# Patient Record
Sex: FEMALE | Race: WHITE | NOT HISPANIC OR LATINO
[De-identification: names, ages, dates, MRNs, and addresses within clinical notes are randomized per-mention and may not be internally consistent; named-entity substitution may affect disease eponyms.]

---

## 2018-07-10 ENCOUNTER — RESULT REVIEW (OUTPATIENT)
Age: 48
End: 2018-07-10

## 2018-11-14 ENCOUNTER — RECORD ABSTRACTING (OUTPATIENT)
Age: 48
End: 2018-11-14

## 2018-11-14 DIAGNOSIS — Z82.0 FAMILY HISTORY OF EPILEPSY AND OTHER DISEASES OF THE NERVOUS SYSTEM: ICD-10-CM

## 2018-11-14 DIAGNOSIS — Z82.62 FAMILY HISTORY OF OSTEOPOROSIS: ICD-10-CM

## 2018-11-14 DIAGNOSIS — A41.02 SEPSIS DUE TO METHICILLIN RESISTANT STAPHYLOCOCCUS AUREUS: ICD-10-CM

## 2018-11-14 DIAGNOSIS — Z86.2 PERSONAL HISTORY OF DISEASES OF THE BLOOD AND BLOOD-FORMING ORGANS AND CERTAIN DISORDERS INVOLVING THE IMMUNE MECHANISM: ICD-10-CM

## 2018-11-14 DIAGNOSIS — Z82.49 FAMILY HISTORY OF ISCHEMIC HEART DISEASE AND OTHER DISEASES OF THE CIRCULATORY SYSTEM: ICD-10-CM

## 2018-11-14 DIAGNOSIS — Z86.79 PERSONAL HISTORY OF OTHER DISEASES OF THE CIRCULATORY SYSTEM: ICD-10-CM

## 2018-11-14 DIAGNOSIS — Z83.438 FAMILY HISTORY OF OTHER DISORDER OF LIPOPROTEIN METABOLISM AND OTHER LIPIDEMIA: ICD-10-CM

## 2018-12-12 ENCOUNTER — APPOINTMENT (OUTPATIENT)
Dept: INTERNAL MEDICINE | Facility: CLINIC | Age: 48
End: 2018-12-12
Payer: COMMERCIAL

## 2018-12-12 VITALS
WEIGHT: 172 LBS | HEART RATE: 68 BPM | DIASTOLIC BLOOD PRESSURE: 70 MMHG | HEIGHT: 65 IN | SYSTOLIC BLOOD PRESSURE: 120 MMHG | BODY MASS INDEX: 28.66 KG/M2

## 2018-12-12 PROCEDURE — 99213 OFFICE O/P EST LOW 20 MIN: CPT | Mod: 25

## 2018-12-12 PROCEDURE — 36415 COLL VENOUS BLD VENIPUNCTURE: CPT

## 2018-12-12 NOTE — REVIEW OF SYSTEMS
[Vision Problems] : no vision problems [Chest Pain] : no chest pain [Palpitations] : no palpitations [Orthopnea] : no orthopnea [Shortness Of Breath] : no shortness of breath [Cough] : no cough [Nocturia] : no nocturia [Frequency] : no frequency [Headache] : no headache [Dizziness] : no dizziness

## 2018-12-12 NOTE — PHYSICAL EXAM
[No Acute Distress] : no acute distress [Well Nourished] : well nourished [No JVD] : no jugular venous distention [No Respiratory Distress] : no respiratory distress  [Clear to Auscultation] : lungs were clear to auscultation bilaterally [Normal Rate] : normal rate  [Regular Rhythm] : with a regular rhythm [No Edema] : there was no peripheral edema [de-identified] :  + split S1, normal S2; no click or rubs, no JVD, no S4, +1/6 JUSTIN at base radiating to right carotid.

## 2018-12-12 NOTE — HISTORY OF PRESENT ILLNESS
[FreeTextEntry1] : here for followup of hypertension and impaired fasting glucose\par \par had mammos 10/3/2018 neg [de-identified] : Hypertension. \par      Compared to last visit the hypertension is at goal . The patients cardiovascular risk factors include hypertension . Lifestyle modifications that the patient has adopted include dietary sodium reduction , increasing physical activity , attempts at weight reduction . Responses to the medications has been excellent . Adverse effects of the medication include none . Patient denies chest pain , palpitations, headaches, dizziness.

## 2018-12-13 LAB
ALBUMIN SERPL ELPH-MCNC: 4.7 G/DL
ALP BLD-CCNC: 86 U/L
ALT SERPL-CCNC: 17 U/L
ANION GAP SERPL CALC-SCNC: 13 MMOL/L
AST SERPL-CCNC: 13 U/L
BILIRUB SERPL-MCNC: 0.2 MG/DL
BUN SERPL-MCNC: 17 MG/DL
CALCIUM SERPL-MCNC: 9.4 MG/DL
CHLORIDE SERPL-SCNC: 105 MMOL/L
CHOLEST SERPL-MCNC: 192 MG/DL
CHOLEST/HDLC SERPL: 4.4 RATIO
CK SERPL-CCNC: 75 U/L
CO2 SERPL-SCNC: 25 MMOL/L
CREAT SERPL-MCNC: 0.77 MG/DL
GLUCOSE SERPL-MCNC: 98 MG/DL
HBA1C MFR BLD HPLC: 6.1 %
HDLC SERPL-MCNC: 44 MG/DL
LDLC SERPL CALC-MCNC: 124 MG/DL
POTASSIUM SERPL-SCNC: 4.3 MMOL/L
PROT SERPL-MCNC: 7.2 G/DL
SODIUM SERPL-SCNC: 143 MMOL/L
TRIGL SERPL-MCNC: 122 MG/DL

## 2019-03-06 ENCOUNTER — APPOINTMENT (OUTPATIENT)
Dept: INTERNAL MEDICINE | Facility: CLINIC | Age: 49
End: 2019-03-06
Payer: COMMERCIAL

## 2019-03-06 VITALS
DIASTOLIC BLOOD PRESSURE: 70 MMHG | BODY MASS INDEX: 28.32 KG/M2 | WEIGHT: 170 LBS | HEIGHT: 65 IN | SYSTOLIC BLOOD PRESSURE: 120 MMHG | HEART RATE: 72 BPM

## 2019-03-06 PROCEDURE — 36415 COLL VENOUS BLD VENIPUNCTURE: CPT

## 2019-03-06 PROCEDURE — 99213 OFFICE O/P EST LOW 20 MIN: CPT | Mod: 25

## 2019-03-06 NOTE — HISTORY OF PRESENT ILLNESS
[FreeTextEntry1] : here for followup of hypertension and impaired fasting glucose [de-identified] : Hypertension. \par      Compared to last visit the hypertension is at goal . The patients cardiovascular risk factors include hypertension . Lifestyle modifications that the patient has adopted include dietary sodium reduction , increasing physical activity , attempts at weight reduction . Responses to the medications has been excellent . Adverse effects of the medication include none . Patient denies chest pain , palpitations, headaches, dizziness. \par \par Last A1c 6.1 Dec

## 2019-03-06 NOTE — ASSESSMENT
[FreeTextEntry1] :  Essential (primary) hypertension - I10 (Primary), Cont med and low salt diet. Urged to increase physical activity and increase efforts at wt loss. BP control remains excellent. \par \par Leukocytosis, unspecified elevated WBC count - D72.829, D/w patient she may be an outlier and slightly high wbc may be normal for her. Check cbc prn. Last WBC stable at 10.9\par  \par Impaired fasting glucose - R73.01, Check A1c if glucose consistently high. D/w pt rationale for it and criteria for dm. Last A1c NOT at goal 6.1. I strongly urged she lose some weight.\par  \par Gen. anxiety disorder - Continue sertraline\par

## 2019-03-06 NOTE — PHYSICAL EXAM
[No Acute Distress] : no acute distress [Well Nourished] : well nourished [No JVD] : no jugular venous distention [No Respiratory Distress] : no respiratory distress  [Clear to Auscultation] : lungs were clear to auscultation bilaterally [Normal Rate] : normal rate  [Regular Rhythm] : with a regular rhythm [No Edema] : there was no peripheral edema [de-identified] :  + split S1, normal S2; no click or rubs, no JVD, no S4, +1/6 JUSTIN at base radiating to right carotid.

## 2019-03-07 LAB
BASOPHILS # BLD AUTO: 0.09 K/UL
BASOPHILS NFR BLD AUTO: 0.9 %
EOSINOPHIL # BLD AUTO: 0.29 K/UL
EOSINOPHIL NFR BLD AUTO: 3 %
HCT VFR BLD CALC: 41.9 %
HGB BLD-MCNC: 12.6 G/DL
IMM GRANULOCYTES NFR BLD AUTO: 0.2 %
LYMPHOCYTES # BLD AUTO: 1.55 K/UL
LYMPHOCYTES NFR BLD AUTO: 15.8 %
MAN DIFF?: NORMAL
MCHC RBC-ENTMCNC: 27.9 PG
MCHC RBC-ENTMCNC: 30.1 GM/DL
MCV RBC AUTO: 92.7 FL
MONOCYTES # BLD AUTO: 0.67 K/UL
MONOCYTES NFR BLD AUTO: 6.8 %
NEUTROPHILS # BLD AUTO: 7.17 K/UL
NEUTROPHILS NFR BLD AUTO: 73.3 %
PLATELET # BLD AUTO: 411 K/UL
RBC # BLD: 4.52 M/UL
RBC # FLD: 13.7 %
WBC # FLD AUTO: 9.79 K/UL

## 2019-05-01 ENCOUNTER — RX RENEWAL (OUTPATIENT)
Age: 49
End: 2019-05-01

## 2019-06-05 ENCOUNTER — APPOINTMENT (OUTPATIENT)
Dept: INTERNAL MEDICINE | Facility: CLINIC | Age: 49
End: 2019-06-05

## 2019-08-15 ENCOUNTER — RX RENEWAL (OUTPATIENT)
Age: 49
End: 2019-08-15

## 2019-08-15 NOTE — ASSESSMENT
[FreeTextEntry1] :  Essential (primary) hypertension - I10 (Primary), Cont med and low salt diet. Urged to increase physical activity and increase efforts at wt loss. BP control remains excellent. \par \par Leukocytosis, unspecified elevated WBC count - D72.829, D/w patient she may be an outlier and slightly high wbc may be normal for her. Check cbc prn. Last WBC stable at 10.9\par  \par Impaired fasting glucose - R73.01, Check A1c if glucose consistently high. D/w pt rationale for it and criteria for dm. Last A1c stable at 5.9\par  \par BONNIE - Continue sertraline\par  Stable

## 2019-09-18 ENCOUNTER — LABORATORY RESULT (OUTPATIENT)
Age: 49
End: 2019-09-18

## 2019-09-18 ENCOUNTER — APPOINTMENT (OUTPATIENT)
Dept: INTERNAL MEDICINE | Facility: CLINIC | Age: 49
End: 2019-09-18
Payer: COMMERCIAL

## 2019-09-18 VITALS
SYSTOLIC BLOOD PRESSURE: 120 MMHG | DIASTOLIC BLOOD PRESSURE: 70 MMHG | WEIGHT: 174 LBS | BODY MASS INDEX: 28.96 KG/M2 | HEART RATE: 78 BPM

## 2019-09-18 DIAGNOSIS — Z78.9 OTHER SPECIFIED HEALTH STATUS: ICD-10-CM

## 2019-09-18 LAB — CYTOLOGY CVX/VAG DOC THIN PREP: NORMAL

## 2019-09-18 PROCEDURE — 99396 PREV VISIT EST AGE 40-64: CPT | Mod: 25

## 2019-09-18 PROCEDURE — G0444 DEPRESSION SCREEN ANNUAL: CPT

## 2019-09-18 PROCEDURE — 36415 COLL VENOUS BLD VENIPUNCTURE: CPT

## 2019-09-18 PROCEDURE — 93000 ELECTROCARDIOGRAM COMPLETE: CPT

## 2019-09-18 NOTE — PHYSICAL EXAM
[No Acute Distress] : no acute distress [Well Nourished] : well nourished [Well Developed] : well developed [Well-Appearing] : well-appearing [Normal Sclera/Conjunctiva] : normal sclera/conjunctiva [PERRL] : pupils equal round and reactive to light [Normal Outer Ear/Nose] : the outer ears and nose were normal in appearance [EOMI] : extraocular movements intact [No JVD] : no jugular venous distention [Normal Oropharynx] : the oropharynx was normal [No Lymphadenopathy] : no lymphadenopathy [Supple] : supple [Thyroid Normal, No Nodules] : the thyroid was normal and there were no nodules present [No Respiratory Distress] : no respiratory distress  [No Accessory Muscle Use] : no accessory muscle use [Clear to Auscultation] : lungs were clear to auscultation bilaterally [Normal Rate] : normal rate  [Regular Rhythm] : with a regular rhythm [Normal S1, S2] : normal S1 and S2 [No Murmur] : no murmur heard [No Carotid Bruits] : no carotid bruits [No Abdominal Bruit] : a ~M bruit was not heard ~T in the abdomen [No Varicosities] : no varicosities [Pedal Pulses Present] : the pedal pulses are present [No Edema] : there was no peripheral edema [No Palpable Aorta] : no palpable aorta [Soft] : abdomen soft [No Extremity Clubbing/Cyanosis] : no extremity clubbing/cyanosis [Non Tender] : non-tender [Non-distended] : non-distended [No HSM] : no HSM [Normal Bowel Sounds] : normal bowel sounds [Normal Anterior Cervical Nodes] : no anterior cervical lymphadenopathy [Normal Posterior Cervical Nodes] : no posterior cervical lymphadenopathy [No CVA Tenderness] : no CVA  tenderness [No Spinal Tenderness] : no spinal tenderness [No Joint Swelling] : no joint swelling [Grossly Normal Strength/Tone] : grossly normal strength/tone [No Rash] : no rash [Coordination Grossly Intact] : coordination grossly intact [No Focal Deficits] : no focal deficits [Normal Gait] : normal gait [Deep Tendon Reflexes (DTR)] : deep tendon reflexes were 2+ and symmetric [Normal Affect] : the affect was normal [Normal Insight/Judgement] : insight and judgment were intact [Normal Appearance] : normal in appearance [No Masses] : no palpable masses [No Nipple Discharge] : no nipple discharge [de-identified] : no S4; + split S1

## 2019-09-18 NOTE — REVIEW OF SYSTEMS
[Anxiety] : anxiety [Fever] : no fever [Chills] : no chills [Vision Problems] : no vision problems [Hearing Loss] : no hearing loss [Chest Pain] : no chest pain [Palpitations] : no palpitations [Orthopnea] : no orthopnea [Shortness Of Breath] : no shortness of breath [Wheezing] : no wheezing [Cough] : no cough [Abdominal Pain] : no abdominal pain [Nausea] : no nausea [Vomiting] : no vomiting [Heartburn] : no heartburn [Dysuria] : no dysuria [Nocturia] : no nocturia [Hematuria] : no hematuria [Frequency] : no frequency [Joint Swelling] : no joint swelling [Muscle Weakness] : no muscle weakness [Skin Rash] : no skin rash [Headache] : no headache [Dizziness] : no dizziness [Confusion] : no confusion [Memory Loss] : no memory loss [Depression] : no depression [Easy Bleeding] : no easy bleeding [FreeTextEntry3] : last eye exam never [de-identified] : controlled with sertraline [FreeTextEntry1] : mammos 10.3.18; pap 7.30.18

## 2019-09-18 NOTE — HISTORY OF PRESENT ILLNESS
[FreeTextEntry1] : here for cpe [de-identified] : Hypertension. \par      Compared to last visit the hypertension is at goal . The patients cardiovascular risk factors include hypertension . Lifestyle modifications that the patient has adopted include dietary sodium reduction , increasing physical activity , attempts at weight reduction . Responses to the medications has been excellent. Adverse effects of the medication include none . Patient denies chest pain , palpitations, headaches, dizziness. Her weight has CHANGED BY +5\par Last A1c 6.1 Dec

## 2019-09-18 NOTE — PHYSICAL EXAM
[No Acute Distress] : no acute distress [Well Nourished] : well nourished [Well-Appearing] : well-appearing [Well Developed] : well developed [Normal Sclera/Conjunctiva] : normal sclera/conjunctiva [PERRL] : pupils equal round and reactive to light [Normal Outer Ear/Nose] : the outer ears and nose were normal in appearance [EOMI] : extraocular movements intact [No JVD] : no jugular venous distention [Normal Oropharynx] : the oropharynx was normal [No Lymphadenopathy] : no lymphadenopathy [Supple] : supple [Thyroid Normal, No Nodules] : the thyroid was normal and there were no nodules present [No Respiratory Distress] : no respiratory distress  [No Accessory Muscle Use] : no accessory muscle use [Clear to Auscultation] : lungs were clear to auscultation bilaterally [Normal Rate] : normal rate  [Regular Rhythm] : with a regular rhythm [Normal S1, S2] : normal S1 and S2 [No Murmur] : no murmur heard [No Carotid Bruits] : no carotid bruits [No Abdominal Bruit] : a ~M bruit was not heard ~T in the abdomen [No Varicosities] : no varicosities [Pedal Pulses Present] : the pedal pulses are present [No Edema] : there was no peripheral edema [No Palpable Aorta] : no palpable aorta [No Extremity Clubbing/Cyanosis] : no extremity clubbing/cyanosis [Soft] : abdomen soft [Non Tender] : non-tender [Non-distended] : non-distended [No HSM] : no HSM [Normal Bowel Sounds] : normal bowel sounds [Normal Posterior Cervical Nodes] : no posterior cervical lymphadenopathy [Normal Anterior Cervical Nodes] : no anterior cervical lymphadenopathy [No CVA Tenderness] : no CVA  tenderness [No Spinal Tenderness] : no spinal tenderness [No Joint Swelling] : no joint swelling [Grossly Normal Strength/Tone] : grossly normal strength/tone [No Rash] : no rash [Coordination Grossly Intact] : coordination grossly intact [No Focal Deficits] : no focal deficits [Normal Gait] : normal gait [Deep Tendon Reflexes (DTR)] : deep tendon reflexes were 2+ and symmetric [Normal Affect] : the affect was normal [Normal Insight/Judgement] : insight and judgment were intact [Normal Appearance] : normal in appearance [No Nipple Discharge] : no nipple discharge [No Masses] : no palpable masses [de-identified] : no S4; + split S1

## 2019-09-18 NOTE — HISTORY OF PRESENT ILLNESS
[FreeTextEntry1] : here for cpe [de-identified] : Hypertension. \par      Compared to last visit the hypertension is at goal . The patients cardiovascular risk factors include hypertension . Lifestyle modifications that the patient has adopted include dietary sodium reduction , increasing physical activity , attempts at weight reduction . Responses to the medications has been excellent. Adverse effects of the medication include none . Patient denies chest pain , palpitations, headaches, dizziness. Her weight has CHANGED BY +5\par Last A1c 6.1 Dec

## 2019-09-18 NOTE — REVIEW OF SYSTEMS
[Anxiety] : anxiety [Fever] : no fever [Chills] : no chills [Vision Problems] : no vision problems [Hearing Loss] : no hearing loss [Chest Pain] : no chest pain [Palpitations] : no palpitations [Orthopnea] : no orthopnea [Shortness Of Breath] : no shortness of breath [Wheezing] : no wheezing [Cough] : no cough [Abdominal Pain] : no abdominal pain [Nausea] : no nausea [Vomiting] : no vomiting [Heartburn] : no heartburn [Dysuria] : no dysuria [Nocturia] : no nocturia [Hematuria] : no hematuria [Frequency] : no frequency [Joint Swelling] : no joint swelling [Muscle Weakness] : no muscle weakness [Skin Rash] : no skin rash [Headache] : no headache [Dizziness] : no dizziness [Confusion] : no confusion [Memory Loss] : no memory loss [Depression] : no depression [Easy Bleeding] : no easy bleeding [FreeTextEntry3] : last eye exam never [de-identified] : controlled with sertraline [FreeTextEntry1] : mammos 10.3.18; pap 7.30.18

## 2019-09-18 NOTE — PLAN
[FreeTextEntry1] : check labs, EKG\par continue meds\par Refer to [facility] for colonoscopy\par Return 3 months

## 2019-09-18 NOTE — ASSESSMENT
[FreeTextEntry1] : Healthy woman. Needs colonoscopy -referred.\par \par Essential (primary) hypertension - I10 (Primary), Cont med and low salt diet. Urged to increase physical activity and increase efforts at wt loss. BP control remains excellent. \par \par Leukocytosis, unspecified elevated WBC count - D72.829, D/w patient she may be an outlier and slightly high wbc may be normal for her. Check cbc prn. Last WBC stable at 9.79\par  \par Impaired fasting glucose - R73.01, Check A1c if glucose consistently high. D/w pt rationale for it and criteria for dm. I strongly urged she lose some weight. Last A1c NOT at goal 6.1. \par  \par Gen. anxiety disorder - Continue sertraline\par \par declines HIV testing\par

## 2019-09-19 LAB
ALBUMIN SERPL ELPH-MCNC: 4.4 G/DL
ALP BLD-CCNC: 65 U/L
ALT SERPL-CCNC: 14 U/L
ANION GAP SERPL CALC-SCNC: 11 MMOL/L
APPEARANCE: ABNORMAL
AST SERPL-CCNC: 14 U/L
BASOPHILS # BLD AUTO: 0.07 K/UL
BASOPHILS NFR BLD AUTO: 0.9 %
BILIRUB SERPL-MCNC: 0.3 MG/DL
BILIRUBIN URINE: NEGATIVE
BLOOD URINE: NEGATIVE
BUN SERPL-MCNC: 19 MG/DL
CALCIUM SERPL-MCNC: 9.3 MG/DL
CHLORIDE SERPL-SCNC: 104 MMOL/L
CHOLEST SERPL-MCNC: 207 MG/DL
CHOLEST/HDLC SERPL: 4.7 RATIO
CO2 SERPL-SCNC: 24 MMOL/L
COLOR: YELLOW
CREAT SERPL-MCNC: 0.71 MG/DL
EOSINOPHIL # BLD AUTO: 0.14 K/UL
EOSINOPHIL NFR BLD AUTO: 1.8 %
GLUCOSE QUALITATIVE U: NEGATIVE
GLUCOSE SERPL-MCNC: 95 MG/DL
HCT VFR BLD CALC: 39.8 %
HDLC SERPL-MCNC: 44 MG/DL
HGB BLD-MCNC: 12.2 G/DL
IMM GRANULOCYTES NFR BLD AUTO: 0.4 %
KETONES URINE: NEGATIVE
LDLC SERPL CALC-MCNC: 141 MG/DL
LEUKOCYTE ESTERASE URINE: NEGATIVE
LYMPHOCYTES # BLD AUTO: 1.37 K/UL
LYMPHOCYTES NFR BLD AUTO: 17.5 %
MAN DIFF?: NORMAL
MCHC RBC-ENTMCNC: 28.6 PG
MCHC RBC-ENTMCNC: 30.7 GM/DL
MCV RBC AUTO: 93.2 FL
MONOCYTES # BLD AUTO: 0.55 K/UL
MONOCYTES NFR BLD AUTO: 7 %
NEUTROPHILS # BLD AUTO: 5.69 K/UL
NEUTROPHILS NFR BLD AUTO: 72.4 %
NITRITE URINE: NEGATIVE
PH URINE: 6.5
PLATELET # BLD AUTO: 306 K/UL
POTASSIUM SERPL-SCNC: 4.4 MMOL/L
PROT SERPL-MCNC: 7 G/DL
PROTEIN URINE: NORMAL
RBC # BLD: 4.27 M/UL
RBC # FLD: 14.3 %
SODIUM SERPL-SCNC: 139 MMOL/L
SPECIFIC GRAVITY URINE: 1.03
TRIGL SERPL-MCNC: 108 MG/DL
TSH SERPL-ACNC: 3.07 UIU/ML
UROBILINOGEN URINE: NORMAL
WBC # FLD AUTO: 7.85 K/UL

## 2019-11-19 ENCOUNTER — RX RENEWAL (OUTPATIENT)
Age: 49
End: 2019-11-19

## 2019-12-11 ENCOUNTER — APPOINTMENT (OUTPATIENT)
Dept: INTERNAL MEDICINE | Facility: CLINIC | Age: 49
End: 2019-12-11
Payer: COMMERCIAL

## 2019-12-11 VITALS
SYSTOLIC BLOOD PRESSURE: 120 MMHG | WEIGHT: 177 LBS | HEIGHT: 65 IN | BODY MASS INDEX: 29.49 KG/M2 | DIASTOLIC BLOOD PRESSURE: 80 MMHG | HEART RATE: 72 BPM

## 2019-12-11 PROCEDURE — 90686 IIV4 VACC NO PRSV 0.5 ML IM: CPT

## 2019-12-11 PROCEDURE — 36415 COLL VENOUS BLD VENIPUNCTURE: CPT

## 2019-12-11 PROCEDURE — 99213 OFFICE O/P EST LOW 20 MIN: CPT | Mod: 25

## 2019-12-11 PROCEDURE — 90471 IMMUNIZATION ADMIN: CPT

## 2019-12-11 NOTE — HISTORY OF PRESENT ILLNESS
[FreeTextEntry1] : here for followup of hypertension and impaired fasting glucose [de-identified] : Hypertension. \par      Compared to last visit the hypertension is at goal . The patients cardiovascular risk factors include hypertension . Lifestyle modifications that the patient has adopted include dietary sodium reduction , increasing physical activity , attempts at weight reduction . Responses to the medications has been excellent. Adverse effects of the medication include none . Patient denies chest pain , palpitations, headaches, dizziness. Her weight has CHANGED BY +3\par Last A1c 6.1 Dec

## 2019-12-11 NOTE — PHYSICAL EXAM
[No JVD] : no jugular venous distention [Normal] : normal rate, regular rhythm, normal S1 and S2 and no murmur heard [No Carotid Bruits] : no carotid bruits [de-identified] : no S4; +split S1 [de-identified] : 1+ edema on the right; trace on the left [de-identified] : no xanthelasmas

## 2019-12-11 NOTE — ASSESSMENT
[FreeTextEntry1] : Essential (primary) hypertension - I10 (Primary), Cont med and low salt diet. Urged to increase physical activity and increase efforts at wt loss. BP control remains excellent. \par \par Leukocytosis, unspecified elevated WBC count - D72.829, D/w patient she may be an outlier and slightly high wbc may be normal for her. Check cbc prn. Last WBC NORMAL AGAIN at 7.85\par  \par Impaired fasting glucose - R73.01, Check A1c if glucose consistently high. D/w pt rationale for it and criteria for dm. I strongly urged she lose some weight. Last A1c NOT at goal 6.1. \par \par CHOL- Check lipid profile and liver function tests. Urged low cholesterol diet as well as weight loss through diet and exercise. Continue medications. LDL goal is less than 100 mg/dl. Last LDL was NOT at goal, 141\par Gen. anxiety disorder - Continue sertraline\par \par Risks/benefits of flu vaccine discussed with patient and patient understands and accepts.\par \par

## 2019-12-11 NOTE — REVIEW OF SYSTEMS
[Negative] : Respiratory [Chest Pain] : no chest pain [Muscle Pain] : no muscle pain [Headache] : no headache [Palpitations] : no palpitations [Dizziness] : no dizziness

## 2019-12-12 LAB
ALBUMIN SERPL ELPH-MCNC: 4.3 G/DL
ALP BLD-CCNC: 70 U/L
ALT SERPL-CCNC: 12 U/L
ANION GAP SERPL CALC-SCNC: 11 MMOL/L
AST SERPL-CCNC: 15 U/L
BASOPHILS # BLD AUTO: 0.08 K/UL
BASOPHILS NFR BLD AUTO: 1 %
BILIRUB SERPL-MCNC: 0.2 MG/DL
BUN SERPL-MCNC: 19 MG/DL
CALCIUM SERPL-MCNC: 9.6 MG/DL
CHLORIDE SERPL-SCNC: 106 MMOL/L
CHOLEST SERPL-MCNC: 188 MG/DL
CHOLEST/HDLC SERPL: 4.2 RATIO
CK SERPL-CCNC: 72 U/L
CO2 SERPL-SCNC: 23 MMOL/L
CREAT SERPL-MCNC: 0.71 MG/DL
EOSINOPHIL # BLD AUTO: 0.16 K/UL
EOSINOPHIL NFR BLD AUTO: 2.1 %
ESTIMATED AVERAGE GLUCOSE: 120 MG/DL
GLUCOSE SERPL-MCNC: 129 MG/DL
HBA1C MFR BLD HPLC: 5.8 %
HCT VFR BLD CALC: 37.9 %
HDLC SERPL-MCNC: 45 MG/DL
HGB BLD-MCNC: 11.9 G/DL
IMM GRANULOCYTES NFR BLD AUTO: 0.3 %
LDLC SERPL CALC-MCNC: 119 MG/DL
LYMPHOCYTES # BLD AUTO: 1.48 K/UL
LYMPHOCYTES NFR BLD AUTO: 19 %
MAN DIFF?: NORMAL
MCHC RBC-ENTMCNC: 29.2 PG
MCHC RBC-ENTMCNC: 31.4 GM/DL
MCV RBC AUTO: 92.9 FL
MONOCYTES # BLD AUTO: 0.51 K/UL
MONOCYTES NFR BLD AUTO: 6.6 %
NEUTROPHILS # BLD AUTO: 5.52 K/UL
NEUTROPHILS NFR BLD AUTO: 71 %
PLATELET # BLD AUTO: 343 K/UL
POTASSIUM SERPL-SCNC: 4.3 MMOL/L
PROT SERPL-MCNC: 7 G/DL
RBC # BLD: 4.08 M/UL
RBC # FLD: 13.7 %
SODIUM SERPL-SCNC: 140 MMOL/L
TRIGL SERPL-MCNC: 120 MG/DL
WBC # FLD AUTO: 7.77 K/UL

## 2020-02-22 ENCOUNTER — RX RENEWAL (OUTPATIENT)
Age: 50
End: 2020-02-22

## 2020-03-11 ENCOUNTER — APPOINTMENT (OUTPATIENT)
Dept: INTERNAL MEDICINE | Facility: CLINIC | Age: 50
End: 2020-03-11
Payer: COMMERCIAL

## 2020-03-11 VITALS
WEIGHT: 180 LBS | HEART RATE: 68 BPM | BODY MASS INDEX: 29.99 KG/M2 | HEIGHT: 65 IN | SYSTOLIC BLOOD PRESSURE: 120 MMHG | DIASTOLIC BLOOD PRESSURE: 80 MMHG

## 2020-03-11 PROCEDURE — 99213 OFFICE O/P EST LOW 20 MIN: CPT | Mod: 25

## 2020-03-11 PROCEDURE — 36415 COLL VENOUS BLD VENIPUNCTURE: CPT

## 2020-03-11 NOTE — PHYSICAL EXAM
[No JVD] : no jugular venous distention [Normal] : normal rate, regular rhythm, normal S1 and S2 and no murmur heard [No Carotid Bruits] : no carotid bruits [de-identified] : no xanthelasmas [de-identified] : no S4; +split S1 [de-identified] : 1+ edema on the right; trace on the left

## 2020-03-11 NOTE — HISTORY OF PRESENT ILLNESS
[FreeTextEntry1] : here for followup of hypertension and impaired fasting glucose [de-identified] : Hypertension. \par      Compared to last visit the hypertension is at goal . The patients cardiovascular risk factors include hypertension . Lifestyle modifications that the patient has adopted include dietary sodium reduction , increasing physical activity , attempts at weight reduction . Responses to the medications has been excellent. Adverse effects of the medication include none . Patient denies chest pain , palpitations, headaches, dizziness. Her weight has CHANGED BY +3\par Last A1c 5.8 Dec

## 2020-03-11 NOTE — ASSESSMENT
[FreeTextEntry1] : Essential (primary) hypertension - I10 (Primary), Cont med and low salt diet. Urged to increase physical activity and increase efforts at wt loss. BP control remains excellent. \par \par Leukocytosis, unspecified elevated WBC count - D72.829, D/w patient she may be an outlier and slightly high wbc may be normal for her. Check cbc prn. Last WBC NORMAL AGAIN at 7.77\par  \par Impaired fasting glucose - R73.01, Check A1c if glucose consistently high. D/w pt rationale for it and criteria for dm. I strongly urged she lose some weight AGAIN but she gained 3. Last A1c NOT at goal 5.8\par \par CHOL- Check lipid profile and liver function tests. Urged low cholesterol diet as well as weight loss through diet and exercise. Continue medications. LDL goal is less than 100 mg/dl. Last LDL was at goal, 119\par \par Gen. anxiety disorder - Continue sertraline\par \par \par \par

## 2020-03-11 NOTE — REVIEW OF SYSTEMS
[Negative] : Respiratory [Chest Pain] : no chest pain [Palpitations] : no palpitations [Muscle Pain] : no muscle pain [Headache] : no headache [Dizziness] : no dizziness

## 2020-03-12 LAB
ALBUMIN SERPL ELPH-MCNC: 4.7 G/DL
ALP BLD-CCNC: 73 U/L
ALT SERPL-CCNC: 15 U/L
ANION GAP SERPL CALC-SCNC: 13 MMOL/L
AST SERPL-CCNC: 17 U/L
BASOPHILS # BLD AUTO: 0.08 K/UL
BASOPHILS NFR BLD AUTO: 1 %
BILIRUB SERPL-MCNC: 0.2 MG/DL
BUN SERPL-MCNC: 24 MG/DL
CALCIUM SERPL-MCNC: 9.5 MG/DL
CHLORIDE SERPL-SCNC: 102 MMOL/L
CHOLEST SERPL-MCNC: 210 MG/DL
CHOLEST/HDLC SERPL: 4.3 RATIO
CO2 SERPL-SCNC: 23 MMOL/L
CREAT SERPL-MCNC: 0.71 MG/DL
EOSINOPHIL # BLD AUTO: 0.12 K/UL
EOSINOPHIL NFR BLD AUTO: 1.5 %
ESTIMATED AVERAGE GLUCOSE: 123 MG/DL
GLUCOSE SERPL-MCNC: 103 MG/DL
HBA1C MFR BLD HPLC: 5.9 %
HCT VFR BLD CALC: 40.7 %
HDLC SERPL-MCNC: 49 MG/DL
HGB BLD-MCNC: 12.4 G/DL
IMM GRANULOCYTES NFR BLD AUTO: 0.4 %
LDLC SERPL CALC-MCNC: 146 MG/DL
LYMPHOCYTES # BLD AUTO: 1.32 K/UL
LYMPHOCYTES NFR BLD AUTO: 16.5 %
MAN DIFF?: NORMAL
MCHC RBC-ENTMCNC: 28.2 PG
MCHC RBC-ENTMCNC: 30.5 GM/DL
MCV RBC AUTO: 92.5 FL
MONOCYTES # BLD AUTO: 0.52 K/UL
MONOCYTES NFR BLD AUTO: 6.5 %
NEUTROPHILS # BLD AUTO: 5.94 K/UL
NEUTROPHILS NFR BLD AUTO: 74.1 %
PLATELET # BLD AUTO: 388 K/UL
POTASSIUM SERPL-SCNC: 4.4 MMOL/L
PROT SERPL-MCNC: 7.4 G/DL
RBC # BLD: 4.4 M/UL
RBC # FLD: 13.3 %
SODIUM SERPL-SCNC: 138 MMOL/L
TRIGL SERPL-MCNC: 80 MG/DL
WBC # FLD AUTO: 8.01 K/UL

## 2020-06-02 ENCOUNTER — RX RENEWAL (OUTPATIENT)
Age: 50
End: 2020-06-02

## 2020-06-16 ENCOUNTER — APPOINTMENT (OUTPATIENT)
Dept: INTERNAL MEDICINE | Facility: CLINIC | Age: 50
End: 2020-06-16
Payer: COMMERCIAL

## 2020-06-16 VITALS
HEART RATE: 68 BPM | SYSTOLIC BLOOD PRESSURE: 120 MMHG | HEIGHT: 65 IN | BODY MASS INDEX: 30.32 KG/M2 | WEIGHT: 182 LBS | DIASTOLIC BLOOD PRESSURE: 78 MMHG

## 2020-06-16 PROCEDURE — 99213 OFFICE O/P EST LOW 20 MIN: CPT | Mod: 25

## 2020-06-16 PROCEDURE — 36415 COLL VENOUS BLD VENIPUNCTURE: CPT

## 2020-06-16 NOTE — HISTORY OF PRESENT ILLNESS
[de-identified] : Hypertension. \par      Compared to last visit the hypertension is at goal . The patients cardiovascular risk factors include hypertension . Lifestyle modifications that the patient has adopted include dietary sodium reduction , increasing physical activity , attempts at weight reduction . Responses to the medications has been excellent. Adverse effects of the medication include none . Patient denies chest pain , palpitations, headaches, dizziness. Her weight has CHANGED BY +2\par Last A1c 5.9 Mar [FreeTextEntry1] : here for followup of hypertension and impaired fasting glucose

## 2020-06-16 NOTE — PHYSICAL EXAM
[No JVD] : no jugular venous distention [Normal] : normal rate, regular rhythm, normal S1 and S2 and no murmur heard [No Carotid Bruits] : no carotid bruits [de-identified] : no xanthelasmas [de-identified] : no S4; +split S1 [de-identified] : 1+ edema on the right; trace on the left

## 2020-06-16 NOTE — PLAN
[FreeTextEntry1] : check labs, continue meds\par Covid ab\par Refer to [facility] for colonoscopy\par Return 3 months

## 2020-06-16 NOTE — ASSESSMENT
[FreeTextEntry1] : Essential (primary) hypertension - I10 (Primary), Cont med and low salt diet. Urged to increase physical activity and increase efforts at wt loss. BP control remains excellent. \par \par Leukocytosis, unspecified elevated WBC count - D72.829, D/w patient she may be an outlier and slightly high wbc may be normal for her. Check cbc prn. Last WBC NORMAL AGAIN at 8.01\par  \par Impaired fasting glucose - R73.01, Check A1c if glucose consistently high. D/w pt rationale for it and criteria for dm. I strongly urged she lose some weight AGAIN but she gained 3. Last A1c NOT at goal 5.9\par \par CHOL- Check lipid profile and liver function tests. Urged low cholesterol diet as well as weight loss through diet and exercise. Continue medications. LDL goal is less than 100 mg/dl. Last LDL was NOT at goal 146\par \par Gen. anxiety disorder - Continue sertraline\par Consents to covid ab testing.\par \par \par \par

## 2020-06-16 NOTE — REVIEW OF SYSTEMS
[Negative] : Respiratory [Palpitations] : no palpitations [Chest Pain] : no chest pain [Headache] : no headache [Muscle Pain] : no muscle pain [Dizziness] : no dizziness

## 2020-06-17 LAB
ALBUMIN SERPL ELPH-MCNC: 4.5 G/DL
ALP BLD-CCNC: 78 U/L
ALT SERPL-CCNC: 13 U/L
ANION GAP SERPL CALC-SCNC: 10 MMOL/L
AST SERPL-CCNC: 12 U/L
BASOPHILS # BLD AUTO: 0.1 K/UL
BASOPHILS NFR BLD AUTO: 1.2 %
BILIRUB SERPL-MCNC: 0.2 MG/DL
BUN SERPL-MCNC: 18 MG/DL
CALCIUM SERPL-MCNC: 9.3 MG/DL
CHLORIDE SERPL-SCNC: 104 MMOL/L
CHOLEST SERPL-MCNC: 219 MG/DL
CHOLEST/HDLC SERPL: 5.2 RATIO
CO2 SERPL-SCNC: 25 MMOL/L
CREAT SERPL-MCNC: 0.75 MG/DL
EOSINOPHIL # BLD AUTO: 0.19 K/UL
EOSINOPHIL NFR BLD AUTO: 2.2 %
ESTIMATED AVERAGE GLUCOSE: 120 MG/DL
GLUCOSE SERPL-MCNC: 88 MG/DL
HBA1C MFR BLD HPLC: 5.8 %
HCT VFR BLD CALC: 38.9 %
HDLC SERPL-MCNC: 42 MG/DL
HGB BLD-MCNC: 11.8 G/DL
IMM GRANULOCYTES NFR BLD AUTO: 0.4 %
LDLC SERPL CALC-MCNC: 138 MG/DL
LYMPHOCYTES # BLD AUTO: 1.83 K/UL
LYMPHOCYTES NFR BLD AUTO: 21.4 %
MAN DIFF?: NORMAL
MCHC RBC-ENTMCNC: 28.4 PG
MCHC RBC-ENTMCNC: 30.3 GM/DL
MCV RBC AUTO: 93.7 FL
MONOCYTES # BLD AUTO: 0.67 K/UL
MONOCYTES NFR BLD AUTO: 7.8 %
NEUTROPHILS # BLD AUTO: 5.74 K/UL
NEUTROPHILS NFR BLD AUTO: 67 %
PLATELET # BLD AUTO: 356 K/UL
POTASSIUM SERPL-SCNC: 4.6 MMOL/L
PROT SERPL-MCNC: 7 G/DL
RBC # BLD: 4.15 M/UL
RBC # FLD: 14.1 %
SARS-COV-2 IGG SERPL IA-ACNC: <0.1 INDEX
SARS-COV-2 IGG SERPL QL IA: NEGATIVE
SODIUM SERPL-SCNC: 138 MMOL/L
TRIGL SERPL-MCNC: 190 MG/DL
WBC # FLD AUTO: 8.56 K/UL

## 2020-09-28 ENCOUNTER — APPOINTMENT (OUTPATIENT)
Dept: INTERNAL MEDICINE | Facility: CLINIC | Age: 50
End: 2020-09-28
Payer: COMMERCIAL

## 2020-09-28 VITALS
SYSTOLIC BLOOD PRESSURE: 122 MMHG | WEIGHT: 183 LBS | DIASTOLIC BLOOD PRESSURE: 70 MMHG | HEIGHT: 65 IN | BODY MASS INDEX: 30.49 KG/M2 | HEART RATE: 66 BPM

## 2020-09-28 DIAGNOSIS — Z86.2 PERSONAL HISTORY OF DISEASES OF THE BLOOD AND BLOOD-FORMING ORGANS AND CERTAIN DISORDERS INVOLVING THE IMMUNE MECHANISM: ICD-10-CM

## 2020-09-28 PROCEDURE — 90686 IIV4 VACC NO PRSV 0.5 ML IM: CPT

## 2020-09-28 PROCEDURE — 36415 COLL VENOUS BLD VENIPUNCTURE: CPT

## 2020-09-28 PROCEDURE — G0442 ANNUAL ALCOHOL SCREEN 15 MIN: CPT

## 2020-09-28 PROCEDURE — 90471 IMMUNIZATION ADMIN: CPT

## 2020-09-28 PROCEDURE — 99213 OFFICE O/P EST LOW 20 MIN: CPT | Mod: 25

## 2020-09-28 NOTE — ASSESSMENT
[FreeTextEntry1] : Essential (primary) hypertension - I10 (Primary), Cont med and low salt diet. Urged to increase physical activity and increase efforts at wt loss. BP control remains excellent. \par  \par Impaired fasting glucose - R73.01, Check A1c if glucose consistently high. D/w pt rationale for it and criteria for dm. I strongly urged she lose some weight AGAIN but she gained 3. Last A1c at goal 5.8\par \par CHOL- Check lipid profile and liver function tests. Urged low cholesterol diet as well as weight loss through diet and exercise. Continue medications. LDL goal is less than 100 mg/dl. Last LDL was NOT at goal 146\par \par Gen. anxiety disorder - Continue sertraline\par \par \par \par \par \par

## 2020-09-28 NOTE — HISTORY OF PRESENT ILLNESS
[FreeTextEntry1] : here for followup of hypertension and impaired fasting glucose\par  [de-identified] : Hypertension. \par      Compared to last visit the hypertension is at goal . The patients cardiovascular risk factors include hypertension . Lifestyle modifications that the patient has adopted include dietary sodium reduction , increasing physical activity , attempts at weight reduction . Responses to the medications has been excellent. Adverse effects of the medication include none . Patient denies chest pain , palpitations, headaches, dizziness. Her weight has CHANGED BY +1\par Last A1c 5.8 Jacquelin

## 2020-09-28 NOTE — PHYSICAL EXAM
[No JVD] : no jugular venous distention [Normal] : normal rate, regular rhythm, normal S1 and S2 and no murmur heard [No Carotid Bruits] : no carotid bruits [de-identified] : no xanthelasmas [de-identified] : no S4; +split S1 [de-identified] : 1+ edema on the right; trace on the left

## 2020-09-29 LAB
ALBUMIN SERPL ELPH-MCNC: 4.9 G/DL
ALP BLD-CCNC: 100 U/L
ALT SERPL-CCNC: 15 U/L
ANION GAP SERPL CALC-SCNC: 14 MMOL/L
AST SERPL-CCNC: 14 U/L
BILIRUB SERPL-MCNC: 0.2 MG/DL
BUN SERPL-MCNC: 18 MG/DL
CALCIUM SERPL-MCNC: 9.7 MG/DL
CHLORIDE SERPL-SCNC: 101 MMOL/L
CHOLEST SERPL-MCNC: 224 MG/DL
CHOLEST/HDLC SERPL: 4.8 RATIO
CO2 SERPL-SCNC: 26 MMOL/L
CREAT SERPL-MCNC: 0.81 MG/DL
GLUCOSE SERPL-MCNC: 103 MG/DL
HDLC SERPL-MCNC: 47 MG/DL
LDLC SERPL CALC-MCNC: 150 MG/DL
POTASSIUM SERPL-SCNC: 4.5 MMOL/L
PROT SERPL-MCNC: 7.4 G/DL
SODIUM SERPL-SCNC: 140 MMOL/L
TRIGL SERPL-MCNC: 135 MG/DL

## 2020-12-21 ENCOUNTER — APPOINTMENT (OUTPATIENT)
Dept: INTERNAL MEDICINE | Facility: CLINIC | Age: 50
End: 2020-12-21
Payer: COMMERCIAL

## 2020-12-21 VITALS
SYSTOLIC BLOOD PRESSURE: 130 MMHG | BODY MASS INDEX: 31.16 KG/M2 | HEIGHT: 65 IN | DIASTOLIC BLOOD PRESSURE: 80 MMHG | HEART RATE: 68 BPM | WEIGHT: 187 LBS

## 2020-12-21 PROCEDURE — 36415 COLL VENOUS BLD VENIPUNCTURE: CPT

## 2020-12-21 PROCEDURE — 99072 ADDL SUPL MATRL&STAF TM PHE: CPT

## 2020-12-21 PROCEDURE — 90715 TDAP VACCINE 7 YRS/> IM: CPT

## 2020-12-21 PROCEDURE — 99213 OFFICE O/P EST LOW 20 MIN: CPT | Mod: 25

## 2020-12-21 PROCEDURE — 90471 IMMUNIZATION ADMIN: CPT

## 2020-12-21 NOTE — PHYSICAL EXAM
[No JVD] : no jugular venous distention [Normal] : normal rate, regular rhythm, normal S1 and S2 and no murmur heard [No Carotid Bruits] : no carotid bruits [de-identified] : no xanthelasmas [de-identified] : no S4; +split S1 [de-identified] : 1+ edema on the right; trace on the left

## 2020-12-21 NOTE — HISTORY OF PRESENT ILLNESS
[FreeTextEntry1] : here for followup of hypertension and impaired fasting glucose\par  [de-identified] : Hypertension. \par      Compared to last visit the hypertension is AT GOAL. The patients cardiovascular risk factors include hypertension . Lifestyle modifications that the patient has adopted include dietary sodium reduction, increasing physical activity, attempts at weight reduction. Responses to the medications has been EXCELLENT. Adverse effects of the medication include none . Patient denies chest pain , palpitations, headaches, dizziness. Her weight has CHANGED BY +4\par Last A1c 5.8 Sept

## 2020-12-21 NOTE — ASSESSMENT
[FreeTextEntry1] : Essential (primary) hypertension - I10 (Primary), Cont med and low salt diet. Urged to increase physical activity and increase efforts at wt loss. BP control remains excellent. \par  \par Impaired fasting glucose - R73.01, Check A1c if glucose consistently high. D/w pt rationale for it and criteria for dm. I strongly urged she lose some weight AGAIN but she gained 4.  Last A1c at goal 5.8\par \par CHOL- Check lipid profile and liver function tests. Urged low cholesterol diet as well as weight loss through diet and exercise. Continue medications. LDL goal is less than 100 mg/dl. Last LDL was NOT at goal 150\par \par Edema- I suspect related to weight. No significant heart, pulm, renal, liver disease.\par \par Gen. anxiety disorder - Continue sertraline\par \par \par \par \par \par

## 2020-12-23 LAB
ALBUMIN SERPL ELPH-MCNC: 4.6 G/DL
ALP BLD-CCNC: 103 U/L
ALT SERPL-CCNC: 16 U/L
ANION GAP SERPL CALC-SCNC: 14 MMOL/L
AST SERPL-CCNC: 15 U/L
BILIRUB DIRECT SERPL-MCNC: 0.1 MG/DL
BILIRUB INDIRECT SERPL-MCNC: 0.1 MG/DL
BILIRUB SERPL-MCNC: 0.2 MG/DL
BUN SERPL-MCNC: 15 MG/DL
CALCIUM SERPL-MCNC: 9.6 MG/DL
CHLORIDE SERPL-SCNC: 101 MMOL/L
CHOLEST SERPL-MCNC: 225 MG/DL
CO2 SERPL-SCNC: 23 MMOL/L
CREAT SERPL-MCNC: 0.81 MG/DL
ESTIMATED AVERAGE GLUCOSE: 120 MG/DL
GLUCOSE SERPL-MCNC: 93 MG/DL
HBA1C MFR BLD HPLC: 5.8 %
HDLC SERPL-MCNC: 46 MG/DL
LDLC SERPL CALC-MCNC: 154 MG/DL
NONHDLC SERPL-MCNC: 179 MG/DL
POTASSIUM SERPL-SCNC: 4.1 MMOL/L
PROT SERPL-MCNC: 7.4 G/DL
SODIUM SERPL-SCNC: 138 MMOL/L
TRIGL SERPL-MCNC: 124 MG/DL

## 2021-01-16 ENCOUNTER — TRANSCRIPTION ENCOUNTER (OUTPATIENT)
Age: 51
End: 2021-01-16

## 2021-03-24 ENCOUNTER — APPOINTMENT (OUTPATIENT)
Dept: INTERNAL MEDICINE | Facility: CLINIC | Age: 51
End: 2021-03-24
Payer: COMMERCIAL

## 2021-03-24 VITALS
HEART RATE: 62 BPM | HEIGHT: 65 IN | BODY MASS INDEX: 31.32 KG/M2 | SYSTOLIC BLOOD PRESSURE: 116 MMHG | WEIGHT: 188 LBS | DIASTOLIC BLOOD PRESSURE: 80 MMHG

## 2021-03-24 PROCEDURE — 36415 COLL VENOUS BLD VENIPUNCTURE: CPT

## 2021-03-24 PROCEDURE — 99072 ADDL SUPL MATRL&STAF TM PHE: CPT

## 2021-03-24 PROCEDURE — 99214 OFFICE O/P EST MOD 30 MIN: CPT | Mod: 25

## 2021-03-24 NOTE — ASSESSMENT
[FreeTextEntry1] : Essential (primary) hypertension - I10 (Primary), Cont med and low salt diet. Urged to increase physical activity and increase efforts at wt loss. BP control remains excellent. \par  \par Impaired fasting glucose - R73.01, Check A1c if glucose consistently high. D/w pt rationale for it and criteria for dm. I strongly urged she lose some weight AGAIN but she gained 4.  Last A1c at goal 5.8\par \par CHOL- Check lipid profile and liver function tests. Urged low cholesterol diet as well as weight loss through diet and exercise. Continue medications. LDL goal is less than 100 mg/dl. Last LDL was NOT at goal 154\par \par Edema- I suspect related to weight. No significant heart, pulm, renal, liver disease.\par \par Gen. anxiety disorder - Continue sertraline\par \par rash- appears to have been a shingles rash involving that dermatome now completely healed with some scabbed papules and reticular network of hyperpigmentation. I informed the patient that it is nothing to do at this particular point. A topical steroid cream at this particular point would not offer any benefit.\par \par \par \par \par \par

## 2021-03-24 NOTE — HISTORY OF PRESENT ILLNESS
[FreeTextEntry1] : here for followup of hypertension and impaired fasting glucose\par Notes rash on right leg\par  [de-identified] : she noted the onset of a prickly patchover her right lateral thigh approximately 2 weeks ago. + Itching initially but not painful. Now notes it is hyperpigmented.\par Hypertension. \par      Compared to last visit the hypertension is AT GOAL. The patients cardiovascular risk factors include hypertension . Lifestyle modifications that the patient has adopted include dietary sodium reduction, increasing physical activity, attempts at weight reduction. Responses to the medications has been EXCELLENT. Adverse effects of the medication include none . Patient denies chest pain , palpitations, headaches, dizziness. Her weight has CHANGED BY +1\par Last A1c 5.8 Sept

## 2021-03-24 NOTE — PHYSICAL EXAM
[No JVD] : no jugular venous distention [Normal] : normal rate, regular rhythm, normal S1 and S2 and no murmur heard [No Carotid Bruits] : no carotid bruits [de-identified] : no xanthelasmas [de-identified] : no S4; +split S1 [de-identified] : 1+ edema on the right; trace on the left [de-identified] : +large patch involving almost the entire lateral right thigh f scabbed linear hypopigmented areas

## 2021-03-25 LAB
ANION GAP SERPL CALC-SCNC: 15 MMOL/L
BUN SERPL-MCNC: 15 MG/DL
CALCIUM SERPL-MCNC: 9.6 MG/DL
CHLORIDE SERPL-SCNC: 104 MMOL/L
CHOLEST SERPL-MCNC: 203 MG/DL
CO2 SERPL-SCNC: 21 MMOL/L
CREAT SERPL-MCNC: 0.74 MG/DL
ESTIMATED AVERAGE GLUCOSE: 128 MG/DL
GLUCOSE SERPL-MCNC: 90 MG/DL
HBA1C MFR BLD HPLC: 6.1 %
HDLC SERPL-MCNC: 44 MG/DL
LDLC SERPL CALC-MCNC: 125 MG/DL
NONHDLC SERPL-MCNC: 159 MG/DL
POTASSIUM SERPL-SCNC: 4.5 MMOL/L
SODIUM SERPL-SCNC: 140 MMOL/L
TRIGL SERPL-MCNC: 169 MG/DL

## 2021-05-27 ENCOUNTER — RX RENEWAL (OUTPATIENT)
Age: 51
End: 2021-05-27

## 2021-06-23 ENCOUNTER — APPOINTMENT (OUTPATIENT)
Dept: INTERNAL MEDICINE | Facility: CLINIC | Age: 51
End: 2021-06-23
Payer: COMMERCIAL

## 2021-06-23 VITALS
HEIGHT: 65 IN | WEIGHT: 186 LBS | BODY MASS INDEX: 30.99 KG/M2 | HEART RATE: 68 BPM | DIASTOLIC BLOOD PRESSURE: 80 MMHG | SYSTOLIC BLOOD PRESSURE: 120 MMHG

## 2021-06-23 PROCEDURE — 36415 COLL VENOUS BLD VENIPUNCTURE: CPT

## 2021-06-23 PROCEDURE — 99213 OFFICE O/P EST LOW 20 MIN: CPT | Mod: 25

## 2021-06-23 PROCEDURE — 99072 ADDL SUPL MATRL&STAF TM PHE: CPT

## 2021-06-23 NOTE — PHYSICAL EXAM
[No JVD] : no jugular venous distention [Normal] : normal rate, regular rhythm, normal S1 and S2 and no murmur heard [No Carotid Bruits] : no carotid bruits [de-identified] : no xanthelasmas [de-identified] : no S4; +split S1 [de-identified] : 1+ edema on the right; trace on the left [de-identified] : +large patch involving almost the entire lateral right thigh f scabbed linear hypopigmented areas

## 2021-06-23 NOTE — ASSESSMENT
[FreeTextEntry1] : Essential (primary) hypertension - I10 (Primary), Cont med and low salt diet. Urged to increase physical activity and increase efforts at wt loss. BP control remains excellent. \par  \par Impaired fasting glucose - R73.01, Check A1c if glucose consistently high. Reviewed with pt rationale for it and criteria for dm. AGAIN, I strongly urged she lose some weight each visit.  Last A1c NOT AT GOAL 6.1\par \par CHOL- Check lipid profile and liver function tests. Urged low cholesterol diet as well as weight loss through diet and exercise. Continue medications. LDL goal is less than 100 mg/dl. Last LDL was at goal 125\par \par Edema- I suspect related to weight. No significant heart, pulm, renal, liver disease.\par \par Gen. anxiety disorder - Continue sertraline\par \par \par \par \par \par \par \par

## 2021-06-23 NOTE — HISTORY OF PRESENT ILLNESS
[FreeTextEntry1] : here for followup of hypertension and impaired fasting glucose\par  [de-identified] : Hypertension. \par      Compared to last visit the hypertension is AT GOAL. The patients cardiovascular risk factors include hypertension . Lifestyle modifications that the patient has adopted include dietary sodium reduction, increasing physical activity, attempts at weight reduction. Responses to the medications has been EXCELLENT. Adverse effects of the medication include none . Patient denies chest pain , palpitations, headaches, dizziness. Her weight has CHANGED BY -2\par Last A1c 6.1

## 2021-06-29 ENCOUNTER — NON-APPOINTMENT (OUTPATIENT)
Age: 51
End: 2021-06-29

## 2021-06-29 LAB
ALBUMIN SERPL ELPH-MCNC: 4.5 G/DL
ALP BLD-CCNC: 99 U/L
ALT SERPL-CCNC: 13 U/L
ANION GAP SERPL CALC-SCNC: 9 MMOL/L
AST SERPL-CCNC: 15 U/L
BILIRUB DIRECT SERPL-MCNC: 0.1 MG/DL
BILIRUB INDIRECT SERPL-MCNC: 0.2 MG/DL
BILIRUB SERPL-MCNC: 0.2 MG/DL
BUN SERPL-MCNC: 16 MG/DL
CALCIUM SERPL-MCNC: 9.7 MG/DL
CHLORIDE SERPL-SCNC: 105 MMOL/L
CHOLEST SERPL-MCNC: 203 MG/DL
CO2 SERPL-SCNC: 25 MMOL/L
CREAT SERPL-MCNC: 0.81 MG/DL
ESTIMATED AVERAGE GLUCOSE: 126 MG/DL
GLUCOSE SERPL-MCNC: 98 MG/DL
HBA1C MFR BLD HPLC: 6 %
HDLC SERPL-MCNC: 41 MG/DL
LDLC SERPL CALC-MCNC: 131 MG/DL
NONHDLC SERPL-MCNC: 163 MG/DL
POTASSIUM SERPL-SCNC: 4.6 MMOL/L
PROT SERPL-MCNC: 7.4 G/DL
SODIUM SERPL-SCNC: 139 MMOL/L
TRIGL SERPL-MCNC: 158 MG/DL

## 2021-09-02 ENCOUNTER — RX RENEWAL (OUTPATIENT)
Age: 51
End: 2021-09-02

## 2021-11-10 ENCOUNTER — APPOINTMENT (OUTPATIENT)
Dept: INTERNAL MEDICINE | Facility: CLINIC | Age: 51
End: 2021-11-10
Payer: COMMERCIAL

## 2021-11-10 ENCOUNTER — NON-APPOINTMENT (OUTPATIENT)
Age: 51
End: 2021-11-10

## 2021-11-10 VITALS
HEIGHT: 65 IN | BODY MASS INDEX: 30.66 KG/M2 | DIASTOLIC BLOOD PRESSURE: 80 MMHG | HEART RATE: 62 BPM | SYSTOLIC BLOOD PRESSURE: 120 MMHG | WEIGHT: 184 LBS

## 2021-11-10 PROCEDURE — 93000 ELECTROCARDIOGRAM COMPLETE: CPT | Mod: 59

## 2021-11-10 PROCEDURE — 90472 IMMUNIZATION ADMIN EACH ADD: CPT

## 2021-11-10 PROCEDURE — 36415 COLL VENOUS BLD VENIPUNCTURE: CPT

## 2021-11-10 PROCEDURE — 90686 IIV4 VACC NO PRSV 0.5 ML IM: CPT

## 2021-11-10 PROCEDURE — 90750 HZV VACC RECOMBINANT IM: CPT

## 2021-11-10 PROCEDURE — 99396 PREV VISIT EST AGE 40-64: CPT | Mod: 25

## 2021-11-10 PROCEDURE — G0444 DEPRESSION SCREEN ANNUAL: CPT | Mod: 59

## 2021-11-10 PROCEDURE — 90471 IMMUNIZATION ADMIN: CPT

## 2021-11-10 NOTE — PLAN
[FreeTextEntry1] : Check labs, ekg and continue meds\par Return 3 months\par Flu vaccine\par Shingles vaccine

## 2021-11-10 NOTE — HEALTH RISK ASSESSMENT
[Yes] : Yes [Monthly or less (1 pt)] : Monthly or less (1 point) [1 or 2 (0 pts)] : 1 or 2 (0 points) [Never (0 pts)] : Never (0 points) [No] : In the past 12 months have you used drugs other than those required for medical reasons? No [0] : 2) Feeling down, depressed, or hopeless: Not at all (0) [PHQ-2 Negative - No further assessment needed] : PHQ-2 Negative - No further assessment needed [Audit-CScore] : 1 [[id number]] : 0 [HIV test declined] : HIV test declined [HIVDate] : 11/21

## 2021-11-10 NOTE — PHYSICAL EXAM
[No Acute Distress] : no acute distress [Well Nourished] : well nourished [Well Developed] : well developed [Well-Appearing] : well-appearing [Normal Sclera/Conjunctiva] : normal sclera/conjunctiva [PERRL] : pupils equal round and reactive to light [EOMI] : extraocular movements intact [Normal Outer Ear/Nose] : the outer ears and nose were normal in appearance [Normal Oropharynx] : the oropharynx was normal [No JVD] : no jugular venous distention [No Lymphadenopathy] : no lymphadenopathy [Supple] : supple [Thyroid Normal, No Nodules] : the thyroid was normal and there were no nodules present [No Respiratory Distress] : no respiratory distress  [No Accessory Muscle Use] : no accessory muscle use [Clear to Auscultation] : lungs were clear to auscultation bilaterally [Normal Rate] : normal rate  [Regular Rhythm] : with a regular rhythm [Normal S1, S2] : normal S1 and S2 [No Murmur] : no murmur heard [No Carotid Bruits] : no carotid bruits [No Abdominal Bruit] : a ~M bruit was not heard ~T in the abdomen [No Varicosities] : no varicosities [Pedal Pulses Present] : the pedal pulses are present [No Palpable Aorta] : no palpable aorta [No Extremity Clubbing/Cyanosis] : no extremity clubbing/cyanosis [Soft] : abdomen soft [Non Tender] : non-tender [Non-distended] : non-distended [No Masses] : no abdominal mass palpated [No HSM] : no HSM [Normal Bowel Sounds] : normal bowel sounds [Normal Posterior Cervical Nodes] : no posterior cervical lymphadenopathy [Normal Anterior Cervical Nodes] : no anterior cervical lymphadenopathy [No CVA Tenderness] : no CVA  tenderness [No Spinal Tenderness] : no spinal tenderness [No Joint Swelling] : no joint swelling [Grossly Normal Strength/Tone] : grossly normal strength/tone [No Rash] : no rash [Coordination Grossly Intact] : coordination grossly intact [No Focal Deficits] : no focal deficits [Normal Gait] : normal gait [Deep Tendon Reflexes (DTR)] : deep tendon reflexes were 2+ and symmetric [Normal Affect] : the affect was normal [Normal Insight/Judgement] : insight and judgment were intact [de-identified] : no S4; split S1 [de-identified] : 1+ edema on the right; trace on the left  [de-identified] : +large patch involving almost the entire lateral right thigh f scabbed linear hypopigmented areas

## 2021-11-10 NOTE — HISTORY OF PRESENT ILLNESS
[FreeTextEntry1] : here for cpe [de-identified] : Hypertension. \par      Compared to last visit the hypertension is AT GOAL. The patients cardiovascular risk factors include hypertension, hyperlipidemia, impaired fasting glucose, and obesity. Lifestyle modifications that the patient has adopted include dietary sodium reduction, increasing physical activity, attempts at weight reduction. Responses to the medications has been EXCELLENT. Adverse effects of the medication include none . Patient denies chest pain , palpitations, headaches, dizziness. Her weight has CHANGED BY -2\par Last A1c 6.0\par Mixed hyperlipidemia- Last   Jacquelin\par The LDL goal for this patient is < 130 mg/dl.  Dietary modifications have included a low fat diet.  Exercise modifications have included NONE. Responses to the medications have been good.  The patient's adherence to the medication treatment is NA AS NOT ON MED. The side effects of the medication include NA AS NOT ON MED; the patient specifically denies myalgias.

## 2021-11-10 NOTE — REVIEW OF SYSTEMS
[Anxiety] : anxiety [Fever] : no fever [Chills] : no chills [Vision Problems] : no vision problems [Hearing Loss] : no hearing loss [Chest Pain] : no chest pain [Palpitations] : no palpitations [Shortness Of Breath] : no shortness of breath [Wheezing] : no wheezing [Cough] : no cough [Dyspnea on Exertion] : no dyspnea on exertion [Abdominal Pain] : no abdominal pain [Nausea] : no nausea [Vomiting] : no vomiting [Heartburn] : no heartburn [Dysuria] : no dysuria [Hematuria] : no hematuria [Muscle Weakness] : no muscle weakness [Muscle Pain] : no muscle pain [Skin Rash] : no skin rash [Headache] : no headache [Dizziness] : no dizziness [Depression] : no depression [Easy Bleeding] : no easy bleeding [FreeTextEntry3] : last eye exam many years ago again [FreeTextEntry8] : no stones or disease

## 2021-11-10 NOTE — ASSESSMENT
[FreeTextEntry1] : CPE- healthy person up-to-date on health maintenance measures. Also up-to-date on recommended vaccines. Urged weight loss through proper diet and regular exercising.\par \par Essential (primary) hypertension - I10 (Primary), Cont med and low salt diet. Urged to increase physical activity and increase efforts at wt loss. BP control remains excellent. \par  \par Impaired fasting glucose - R73.01, Check A1c if glucose consistently high. Reviewed with pt rationale for it and criteria for dm. AGAIN, I strongly urged she lose some weight each visit.  Last A1c NOT AT GOAL 6.0\par \par CHOL- Check lipid profile and liver function tests. Urged low cholesterol diet as well as weight loss through diet and exercise. Continue medications. LDL goal is less than 100 mg/dl. Last LDL was NOT AT GOAL 131 \par \par Edema- I suspect related to weight. No significant heart, pulm, renal, liver disease.\par \par Gen. anxiety disorder - Continue sertraline.\par \par Risks/benefits of Shingrix vaccine discussed with patient and patient understands and accepts.\par Risks/benefits of flu vaccine discussed with patient and patient understands and accepts.\par \par \par \par \par \par \par \par \par

## 2021-11-11 LAB
ALBUMIN SERPL ELPH-MCNC: 4.5 G/DL
ALP BLD-CCNC: 102 U/L
ALT SERPL-CCNC: 12 U/L
ANION GAP SERPL CALC-SCNC: 13 MMOL/L
APPEARANCE: CLEAR
AST SERPL-CCNC: 14 U/L
BASOPHILS # BLD AUTO: 0.1 K/UL
BASOPHILS NFR BLD AUTO: 1.1 %
BILIRUB DIRECT SERPL-MCNC: 0.1 MG/DL
BILIRUB INDIRECT SERPL-MCNC: NORMAL MG/DL
BILIRUB SERPL-MCNC: <0.2 MG/DL
BILIRUBIN URINE: NEGATIVE
BLOOD URINE: NEGATIVE
BUN SERPL-MCNC: 16 MG/DL
CALCIUM SERPL-MCNC: 9.6 MG/DL
CHLORIDE SERPL-SCNC: 104 MMOL/L
CHOLEST SERPL-MCNC: 220 MG/DL
CO2 SERPL-SCNC: 24 MMOL/L
COLOR: YELLOW
CREAT SERPL-MCNC: 1.12 MG/DL
EOSINOPHIL # BLD AUTO: 0.25 K/UL
EOSINOPHIL NFR BLD AUTO: 2.8 %
ESTIMATED AVERAGE GLUCOSE: 126 MG/DL
GLUCOSE QUALITATIVE U: NEGATIVE
GLUCOSE SERPL-MCNC: 101 MG/DL
HBA1C MFR BLD HPLC: 6 %
HCT VFR BLD CALC: 39.8 %
HDLC SERPL-MCNC: 44 MG/DL
HGB BLD-MCNC: 12.5 G/DL
IMM GRANULOCYTES NFR BLD AUTO: 0.3 %
KETONES URINE: NEGATIVE
LDLC SERPL CALC-MCNC: 120 MG/DL
LEUKOCYTE ESTERASE URINE: NEGATIVE
LYMPHOCYTES # BLD AUTO: 1.56 K/UL
LYMPHOCYTES NFR BLD AUTO: 17.4 %
MAN DIFF?: NORMAL
MCHC RBC-ENTMCNC: 28.9 PG
MCHC RBC-ENTMCNC: 31.4 GM/DL
MCV RBC AUTO: 92.1 FL
MONOCYTES # BLD AUTO: 0.73 K/UL
MONOCYTES NFR BLD AUTO: 8.1 %
NEUTROPHILS # BLD AUTO: 6.29 K/UL
NEUTROPHILS NFR BLD AUTO: 70.3 %
NITRITE URINE: NEGATIVE
NONHDLC SERPL-MCNC: 176 MG/DL
PH URINE: 6
PLATELET # BLD AUTO: 395 K/UL
POTASSIUM SERPL-SCNC: 4.5 MMOL/L
PROT SERPL-MCNC: 7.3 G/DL
PROTEIN URINE: NORMAL
RBC # BLD: 4.32 M/UL
RBC # FLD: 14.1 %
SODIUM SERPL-SCNC: 142 MMOL/L
SPECIFIC GRAVITY URINE: 1.02
TRIGL SERPL-MCNC: 282 MG/DL
TSH SERPL-ACNC: 2 UIU/ML
UROBILINOGEN URINE: NORMAL
WBC # FLD AUTO: 8.96 K/UL

## 2021-11-13 ENCOUNTER — RX RENEWAL (OUTPATIENT)
Age: 51
End: 2021-11-13

## 2022-01-18 ENCOUNTER — NON-APPOINTMENT (OUTPATIENT)
Age: 52
End: 2022-01-18

## 2022-01-18 ENCOUNTER — APPOINTMENT (OUTPATIENT)
Dept: GASTROENTEROLOGY | Facility: CLINIC | Age: 52
End: 2022-01-18
Payer: COMMERCIAL

## 2022-01-18 VITALS
RESPIRATION RATE: 16 BRPM | SYSTOLIC BLOOD PRESSURE: 122 MMHG | TEMPERATURE: 97.8 F | WEIGHT: 185 LBS | BODY MASS INDEX: 30.82 KG/M2 | DIASTOLIC BLOOD PRESSURE: 76 MMHG | HEIGHT: 65 IN

## 2022-01-18 PROCEDURE — 99242 OFF/OP CONSLTJ NEW/EST SF 20: CPT

## 2022-01-18 NOTE — HISTORY OF PRESENT ILLNESS
[FreeTextEntry1] : 51 year old F PMH htn, abdominal surgery as a child, D&C, h/o MRSA/bacteremia/bronchoscopy, presents for evaluation of colon cancer screening\par \par No prior colonoscopy\par \par Patient denies abdominal pain , n/v/heartburn, reflux, dysphagia/odynophagia, change in bowel habits, diarrhea, constipation, brbpr, melena. no weight loss.  Good appetite and energy level. Patient has daily BM, denies regular NSAID use. \par \par \par \par fam hx- negative for colon polyps, colon cancer\par \par

## 2022-01-18 NOTE — PHYSICAL EXAM
[General Appearance - Alert] : alert [General Appearance - In No Acute Distress] : in no acute distress [Sclera] : the sclera and conjunctiva were normal [Outer Ear] : the ears and nose were normal in appearance [Neck Appearance] : the appearance of the neck was normal [] : no respiratory distress [Apical Impulse] : the apical impulse was normal [Abdomen Soft] : soft [Abdomen Tenderness] : non-tender [FreeTextEntry1] : deferred [Abnormal Walk] : normal gait [Skin Color & Pigmentation] : normal skin color and pigmentation [Oriented To Time, Place, And Person] : oriented to person, place, and time

## 2022-01-18 NOTE — REASON FOR VISIT
[Consultation] : a consultation visit [FreeTextEntry1] : Patient seen at the request of Dr. Néstor Moore for the evaluation of colon cancer screening

## 2022-01-18 NOTE — ASSESSMENT
[FreeTextEntry1] : Screening colonoscopy\par Risks (including but not limited to sedation risks, infection, bleeding, perforation, possibility of missed lesions), benefits and alternatives to procedure, including not doing the procedure, were discussed with patient. Patient understood and agreed to proceed with colonoscopy. \par Colonoscopy preparation instructions reviewed with patient.\par 2 Dulcolax two days prior to procedure + Split MiraLAX/Dulcolax preparation\par

## 2022-02-07 ENCOUNTER — APPOINTMENT (OUTPATIENT)
Dept: INTERNAL MEDICINE | Facility: CLINIC | Age: 52
End: 2022-02-07
Payer: COMMERCIAL

## 2022-02-07 VITALS
WEIGHT: 186 LBS | SYSTOLIC BLOOD PRESSURE: 130 MMHG | DIASTOLIC BLOOD PRESSURE: 80 MMHG | HEIGHT: 65 IN | HEART RATE: 66 BPM | BODY MASS INDEX: 30.99 KG/M2

## 2022-02-07 VITALS
HEIGHT: 65 IN | SYSTOLIC BLOOD PRESSURE: 130 MMHG | BODY MASS INDEX: 30.99 KG/M2 | RESPIRATION RATE: 18 BRPM | WEIGHT: 186 LBS | DIASTOLIC BLOOD PRESSURE: 80 MMHG | HEART RATE: 66 BPM

## 2022-02-07 PROCEDURE — 90471 IMMUNIZATION ADMIN: CPT

## 2022-02-07 PROCEDURE — 99213 OFFICE O/P EST LOW 20 MIN: CPT | Mod: 25

## 2022-02-07 PROCEDURE — 36415 COLL VENOUS BLD VENIPUNCTURE: CPT

## 2022-02-07 PROCEDURE — 90750 HZV VACC RECOMBINANT IM: CPT

## 2022-02-07 NOTE — PLAN
[FreeTextEntry1] : Check labs and continue meds\par Return 3 months\par mammograms and ultrasonogram of breasts\par Shingles vaccine #2

## 2022-02-07 NOTE — ASSESSMENT
[FreeTextEntry1] : Essential (primary) hypertension - I10 (Primary), Cont med and low salt diet. Urged to increase physical activity and increase efforts at wt loss. BP control remains excellent. \par  \par Impaired fasting glucose - R73.01, Check A1c if glucose consistently high. Reviewed with pt rationale for it and criteria for dm. AGAIN, I strongly urged she lose some weight each visit.  Last A1c NOT AT GOAL 6.0\par \par CHOL- Check lipid profile and liver function tests. Urged low cholesterol diet as well as weight loss through diet and exercise. Continue medications. LDL goal is less than 100 mg/dl. Last LDL was AT GOAL 120 \par \par Edema- I suspect related to weight. Mild, none today. No significant heart, pulm, renal, liver disease. \par \par Gen. anxiety disorder - Continue sertraline.\par \par Risks/benefits of Shingrix vaccine discussed with patient and patient understands and accepts.\par \par \par \par \par \par \par \par \par \par

## 2022-02-07 NOTE — HISTORY OF PRESENT ILLNESS
[FreeTextEntry1] : here for followup of hypertension and impaired fasting glucose\par has 3/22/2022 appt for colonoscopy\par  [de-identified] : Hypertension. \par      Compared to last visit the hypertension is AT GOAL. The patients cardiovascular risk factors include hypertension, hyperlipidemia, impaired fasting glucose, and obesity. Lifestyle modifications that the patient has adopted include dietary sodium reduction, increasing physical activity, attempts at weight reduction. Responses to the medications has been EXCELLENT. Adverse effects of the medication include none . Patient denies chest pain , palpitations, headaches, dizziness. Her weight has CHANGED BY -2\par Last A1c 6.0\par Mixed hyperlipidemia- Last   Jacquelin\par The LDL goal for this patient is < 130 mg/dl.  Dietary modifications have included a low fat diet.  Exercise modifications have included NONE. Responses to the medications have been good.  The patient's adherence to the medication treatment is NA AS NOT ON MED. The side effects of the medication include NA AS NOT ON MED; the patient specifically denies myalgias.

## 2022-02-07 NOTE — PHYSICAL EXAM
[No Acute Distress] : no acute distress [No JVD] : no jugular venous distention [Normal] : normal rate, regular rhythm, normal S1 and S2 and no murmur heard [No Carotid Bruits] : no carotid bruits [No Edema] : there was no peripheral edema [de-identified] : Obese [de-identified] : no S4; split S1

## 2022-03-19 ENCOUNTER — RESULT REVIEW (OUTPATIENT)
Age: 52
End: 2022-03-19

## 2022-03-21 ENCOUNTER — RESULT REVIEW (OUTPATIENT)
Age: 52
End: 2022-03-21

## 2022-03-22 ENCOUNTER — APPOINTMENT (OUTPATIENT)
Dept: GASTROENTEROLOGY | Facility: HOSPITAL | Age: 52
End: 2022-03-22
Payer: COMMERCIAL

## 2022-03-22 ENCOUNTER — RESULT REVIEW (OUTPATIENT)
Age: 52
End: 2022-03-22

## 2022-03-22 PROCEDURE — 45380 COLONOSCOPY AND BIOPSY: CPT

## 2022-05-11 ENCOUNTER — APPOINTMENT (OUTPATIENT)
Dept: INTERNAL MEDICINE | Facility: CLINIC | Age: 52
End: 2022-05-11
Payer: COMMERCIAL

## 2022-05-11 VITALS
HEIGHT: 65 IN | SYSTOLIC BLOOD PRESSURE: 118 MMHG | DIASTOLIC BLOOD PRESSURE: 80 MMHG | WEIGHT: 186 LBS | BODY MASS INDEX: 30.99 KG/M2 | HEART RATE: 68 BPM

## 2022-05-11 PROCEDURE — 99214 OFFICE O/P EST MOD 30 MIN: CPT | Mod: 25

## 2022-05-11 PROCEDURE — 36415 COLL VENOUS BLD VENIPUNCTURE: CPT

## 2022-05-11 NOTE — HISTORY OF PRESENT ILLNESS
[FreeTextEntry1] : here for followup of hypertension and impaired fasting glucose\par \par  [de-identified] : Hypertension. \par      Compared to last visit the hypertension is AT GOAL. The patients cardiovascular risk factors include hypertension, hyperlipidemia, impaired fasting glucose, and obesity. Lifestyle modifications that the patient has adopted include dietary sodium reduction, increasing physical activity, attempts at weight reduction. Responses to the medications has been EXCELLENT. Adverse effects of the medication include none . Patient denies chest pain , palpitations, headaches, dizziness. Her weight has CHANGED BY 0\par Last A1c 6.0\par Mixed hyperlipidemia- Last   Nov\par The LDL goal for this patient is < 130 mg/dl.  Dietary modifications have included a low fat diet.  Exercise modifications have included NONE. Responses to the medications have been NOT APPLICABLE  AS NOT ON MED.The patient's adherence to the medication treatment is NA AS NOT ON MED. The side effects of the medication include NA AS NOT ON MED; the patient specifically denies myalgias.

## 2022-05-11 NOTE — ASSESSMENT
[FreeTextEntry1] : Essential (primary) hypertension - I10 (Primary), Cont med and low salt diet. Urged to increase physical activity and increase efforts at wt loss. BP control remains excellent. \par  \par Impaired fasting glucose - R73.01, Check A1c if glucose consistently high. Reviewed with pt rationale for it and criteria for dm. AGAIN, I strongly urged she lose some weight each visit.  As the weather will soon be warming up I strongly encouraged her to get out and walk more often and more briskly.  Last A1c NOT AT GOAL 6.0\par \par CHOL- Check lipid profile and liver function tests. Urged low cholesterol diet as well as weight loss through diet and exercise. LDL goal is less than 130 mg/dl. Last LDL was AT GOAL 120 \par \par Edema- I suspect related to weight. Mild, none today. No significant heart, pulm, renal, liver disease. \par \par Gen. anxiety disorder -doing well on sertraline 100 mg daily. Continue sertraline.\par \par colonic polyps- 3/22/2022 +2 polyps, [facility]; re-check 2025\par \par \par \par \par \par \par \par

## 2022-05-11 NOTE — PHYSICAL EXAM
[No Acute Distress] : no acute distress [No JVD] : no jugular venous distention [Normal] : normal rate, regular rhythm, normal S1 and S2 and no murmur heard [No Carotid Bruits] : no carotid bruits [No Edema] : there was no peripheral edema [de-identified] : Obese [de-identified] : no S4; split S1

## 2022-05-12 LAB
ALBUMIN SERPL ELPH-MCNC: 4.7 G/DL
ALP BLD-CCNC: 107 U/L
ALT SERPL-CCNC: 17 U/L
ANION GAP SERPL CALC-SCNC: 12 MMOL/L
AST SERPL-CCNC: 16 U/L
BILIRUB SERPL-MCNC: 0.3 MG/DL
BUN SERPL-MCNC: 15 MG/DL
CALCIUM SERPL-MCNC: 9.7 MG/DL
CHLORIDE SERPL-SCNC: 103 MMOL/L
CHOLEST SERPL-MCNC: 214 MG/DL
CO2 SERPL-SCNC: 26 MMOL/L
CREAT SERPL-MCNC: 0.83 MG/DL
EGFR: 84 ML/MIN/1.73M2
ESTIMATED AVERAGE GLUCOSE: 131 MG/DL
GLUCOSE SERPL-MCNC: 93 MG/DL
HBA1C MFR BLD HPLC: 6.2 %
HDLC SERPL-MCNC: 48 MG/DL
LDLC SERPL CALC-MCNC: 142 MG/DL
NONHDLC SERPL-MCNC: 166 MG/DL
POTASSIUM SERPL-SCNC: 4.6 MMOL/L
PROT SERPL-MCNC: 7.2 G/DL
SODIUM SERPL-SCNC: 141 MMOL/L
TRIGL SERPL-MCNC: 123 MG/DL

## 2022-05-18 ENCOUNTER — RX RENEWAL (OUTPATIENT)
Age: 52
End: 2022-05-18

## 2022-08-10 ENCOUNTER — APPOINTMENT (OUTPATIENT)
Dept: INTERNAL MEDICINE | Facility: CLINIC | Age: 52
End: 2022-08-10

## 2022-08-10 VITALS
DIASTOLIC BLOOD PRESSURE: 70 MMHG | BODY MASS INDEX: 30.32 KG/M2 | SYSTOLIC BLOOD PRESSURE: 118 MMHG | WEIGHT: 182 LBS | HEART RATE: 66 BPM | HEIGHT: 65 IN

## 2022-08-10 PROCEDURE — 36415 COLL VENOUS BLD VENIPUNCTURE: CPT

## 2022-08-10 PROCEDURE — 99213 OFFICE O/P EST LOW 20 MIN: CPT | Mod: 25

## 2022-08-10 NOTE — PHYSICAL EXAM
[No Acute Distress] : no acute distress [No JVD] : no jugular venous distention [Normal] : normal rate, regular rhythm, normal S1 and S2 and no murmur heard [No Carotid Bruits] : no carotid bruits [No Edema] : there was no peripheral edema [de-identified] : Obese [de-identified] : no S4; split S1

## 2022-08-10 NOTE — HISTORY OF PRESENT ILLNESS
[FreeTextEntry1] : here for followup of hypertension and impaired fasting glucose\par \par  [de-identified] : Hypertension. \par      Compared to last visit the hypertension is AT GOAL. The patients cardiovascular risk factors include hypertension, hyperlipidemia, impaired fasting glucose, and obesity. Lifestyle modifications that the patient has adopted include dietary sodium reduction, increasing physical activity, attempts at weight reduction. Responses to the medications has been EXCELLENT. Adverse effects of the medication include none . Patient denies chest pain , palpitations, headaches, dizziness. Her weight has CHANGED BY -4\par Last A1c 6.2\par Mixed hyperlipidemia- Last   Nov\par The LDL goal for this patient is < 130 mg/dl.  Dietary modifications have included a low fat diet.  Exercise modifications have included NONE. Responses to the medications have been NOT APPLICABLE  AS NOT ON MED.The patient's adherence to the medication treatment is NA AS NOT ON MED. The side effects of the medication include NA AS NOT ON MED; the patient specifically denies myalgias.

## 2022-08-10 NOTE — ASSESSMENT
[FreeTextEntry1] : Essential (primary) hypertension - I10 (Primary), Cont med and low salt diet. Urged to increase physical activity and increase efforts at wt loss. BP control remains excellent. \par  \par Impaired fasting glucose - R73.01, Check A1c if glucose consistently high. Reviewed with pt rationale for it and criteria for dm. AGAIN, I strongly urged she lose some weight each visit.  As the weather will soon be warming up I strongly encouraged her to get out and walk more often and more briskly.  Last A1c NOT AT GOAL 6.2\par \par CHOL- Check lipid profile and liver function tests. Urged low cholesterol diet as well as weight loss through diet and exercise. LDL goal is less than 130 mg/dl. Last LDL was NOT AT GOAL 142 \par \par Gen. anxiety disorder -doing well on sertraline 100 mg daily. Continue sertraline.\par \par \par \par \par \par \par \par \par \par

## 2022-08-11 LAB
ALBUMIN SERPL ELPH-MCNC: 4.5 G/DL
ALP BLD-CCNC: 99 U/L
ALT SERPL-CCNC: 15 U/L
ANION GAP SERPL CALC-SCNC: 11 MMOL/L
AST SERPL-CCNC: 14 U/L
BILIRUB SERPL-MCNC: 0.2 MG/DL
BUN SERPL-MCNC: 16 MG/DL
CALCIUM SERPL-MCNC: 9.8 MG/DL
CHLORIDE SERPL-SCNC: 105 MMOL/L
CHOLEST SERPL-MCNC: 217 MG/DL
CO2 SERPL-SCNC: 24 MMOL/L
CREAT SERPL-MCNC: 0.77 MG/DL
EGFR: 92 ML/MIN/1.73M2
ESTIMATED AVERAGE GLUCOSE: 128 MG/DL
GLUCOSE SERPL-MCNC: 98 MG/DL
HBA1C MFR BLD HPLC: 6.1 %
HDLC SERPL-MCNC: 45 MG/DL
LDLC SERPL CALC-MCNC: 148 MG/DL
NONHDLC SERPL-MCNC: 172 MG/DL
POTASSIUM SERPL-SCNC: 4.5 MMOL/L
PROT SERPL-MCNC: 7.4 G/DL
SODIUM SERPL-SCNC: 140 MMOL/L
TRIGL SERPL-MCNC: 119 MG/DL

## 2022-10-22 NOTE — HEALTH RISK ASSESSMENT
[Yes] : Yes [Monthly or less (1 pt)] : Monthly or less (1 point) [1 or 2 (0 pts)] : 1 or 2 (0 points) [Never (0 pts)] : Never (0 points) [Audit-CScore] : 1 Patient requests all Lab, Cardiology, and Radiology Results on their Discharge Instructions

## 2022-11-16 ENCOUNTER — APPOINTMENT (OUTPATIENT)
Dept: INTERNAL MEDICINE | Facility: CLINIC | Age: 52
End: 2022-11-16

## 2022-11-16 VITALS
DIASTOLIC BLOOD PRESSURE: 70 MMHG | HEART RATE: 68 BPM | SYSTOLIC BLOOD PRESSURE: 124 MMHG | WEIGHT: 183 LBS | BODY MASS INDEX: 30.49 KG/M2 | HEIGHT: 65 IN

## 2022-11-16 DIAGNOSIS — R92.2 INCONCLUSIVE MAMMOGRAM: ICD-10-CM

## 2022-11-16 PROCEDURE — 90686 IIV4 VACC NO PRSV 0.5 ML IM: CPT

## 2022-11-16 PROCEDURE — 99214 OFFICE O/P EST MOD 30 MIN: CPT | Mod: 25

## 2022-11-16 PROCEDURE — G0008: CPT

## 2022-11-16 PROCEDURE — 36415 COLL VENOUS BLD VENIPUNCTURE: CPT

## 2022-11-16 NOTE — HISTORY OF PRESENT ILLNESS
[FreeTextEntry1] : here for followup of hypertension and impaired fasting glucose\par \par  [de-identified] : Hypertension. \par      Compared to last visit the hypertension is AT GOAL. The patients cardiovascular risk factors include hypertension, hyperlipidemia, impaired fasting glucose, and obesity. Lifestyle modifications that the patient has adopted include dietary sodium reduction, increasing physical activity, attempts at weight reduction. Responses to the medications has been EXCELLENT. Adverse effects of the medication include none . Patient denies chest pain , palpitations, headaches, dizziness. Her weight has CHANGED BY +1\par Last A1c 6.1\par Mixed hyperlipidemia- Last LDL  148  Aug\par The LDL goal for this patient is < 130 mg/dl.  Dietary modifications have included a low fat diet.  Exercise modifications have included NONE. Responses to the medications have been NOT APPLICABLE  AS NOT ON MED.The patient's adherence to the medication treatment is NA AS NOT ON MED. The side effects of the medication include NA AS NOT ON MED; the patient specifically denies myalgias.

## 2022-11-16 NOTE — PHYSICAL EXAM
[No Acute Distress] : no acute distress [No JVD] : no jugular venous distention [Normal] : normal rate, regular rhythm, normal S1 and S2 and no murmur heard [No Carotid Bruits] : no carotid bruits [No Edema] : there was no peripheral edema [de-identified] : Obese [de-identified] : no S4; split S1

## 2022-11-16 NOTE — ASSESSMENT
[FreeTextEntry1] : Essential (primary) hypertension - I10 (Primary), Cont med and low salt diet. Urged to increase physical activity and increase efforts at wt loss. BP control remains excellent. \par  \par Impaired fasting glucose - R73.01 Reviewed with pt rationale for A1c and criteria for dm. AGAIN, I strongly urged she lose some weight each visit.  As the weather will soon be warming up I strongly encouraged her to get out and walk more often and more briskly.  Last A1c NOT AT GOAL 6.1\par \par CHOL- Check lipid profile and liver function tests. Urged low cholesterol diet as well as weight loss through diet and exercise. LDL goal is less than 130 mg/dl. Last LDL was NOT AT GOAL 148 \par \par Gen. anxiety disorder -doing well on sertraline 100 mg daily. Continue sertraline.\par \par Again, I strongly recommended she get screening mammograms with ultrasonograms. Reordered again today.\par \par Risks/benefits of Flu vaccine d/w patient and patient understands and accepts. \par \par \par \par \par \par \par \par \par \par \par

## 2022-11-16 NOTE — PLAN
[FreeTextEntry1] : Check labs and continue meds\par Return 3 months\par Flu vaccine\par Mammograms, ultrasonogram's

## 2022-11-17 LAB
ALBUMIN SERPL ELPH-MCNC: 4.6 G/DL
ALP BLD-CCNC: 101 U/L
ALT SERPL-CCNC: 14 U/L
ANION GAP SERPL CALC-SCNC: 12 MMOL/L
AST SERPL-CCNC: 14 U/L
BILIRUB SERPL-MCNC: 0.2 MG/DL
BUN SERPL-MCNC: 18 MG/DL
CALCIUM SERPL-MCNC: 9.6 MG/DL
CHLORIDE SERPL-SCNC: 104 MMOL/L
CHOLEST SERPL-MCNC: 223 MG/DL
CO2 SERPL-SCNC: 22 MMOL/L
CREAT SERPL-MCNC: 0.72 MG/DL
EGFR: 99 ML/MIN/1.73M2
ESTIMATED AVERAGE GLUCOSE: 131 MG/DL
GLUCOSE SERPL-MCNC: 100 MG/DL
HBA1C MFR BLD HPLC: 6.2 %
HDLC SERPL-MCNC: 47 MG/DL
LDLC SERPL CALC-MCNC: 154 MG/DL
NONHDLC SERPL-MCNC: 176 MG/DL
POTASSIUM SERPL-SCNC: 4.4 MMOL/L
PROT SERPL-MCNC: 7.1 G/DL
SODIUM SERPL-SCNC: 139 MMOL/L
TRIGL SERPL-MCNC: 109 MG/DL

## 2023-01-12 ENCOUNTER — RESULT REVIEW (OUTPATIENT)
Age: 53
End: 2023-01-12

## 2023-02-09 ENCOUNTER — RESULT REVIEW (OUTPATIENT)
Age: 53
End: 2023-02-09

## 2023-02-16 ENCOUNTER — APPOINTMENT (OUTPATIENT)
Dept: INTERNAL MEDICINE | Facility: CLINIC | Age: 53
End: 2023-02-16
Payer: COMMERCIAL

## 2023-02-16 VITALS
HEIGHT: 65 IN | HEART RATE: 60 BPM | SYSTOLIC BLOOD PRESSURE: 120 MMHG | BODY MASS INDEX: 30.16 KG/M2 | DIASTOLIC BLOOD PRESSURE: 80 MMHG | WEIGHT: 181 LBS

## 2023-02-16 PROCEDURE — 99214 OFFICE O/P EST MOD 30 MIN: CPT | Mod: 25

## 2023-02-16 PROCEDURE — G0444 DEPRESSION SCREEN ANNUAL: CPT | Mod: 59

## 2023-02-16 PROCEDURE — 36415 COLL VENOUS BLD VENIPUNCTURE: CPT

## 2023-02-16 NOTE — HEALTH RISK ASSESSMENT
[0] : 2) Feeling down, depressed, or hopeless: Not at all (0) [PHQ-2 Negative - No further assessment needed] : PHQ-2 Negative - No further assessment needed [[id number]] : 0

## 2023-02-16 NOTE — PHYSICAL EXAM
[No Acute Distress] : no acute distress [No JVD] : no jugular venous distention [Normal] : normal rate, regular rhythm, normal S1 and S2 and no murmur heard [No Carotid Bruits] : no carotid bruits [No Edema] : there was no peripheral edema [de-identified] : Obese [de-identified] : no S4; split S1

## 2023-02-16 NOTE — HISTORY OF PRESENT ILLNESS
[FreeTextEntry1] : here for followup of hypertension and impaired fasting glucose\par \par  [de-identified] : Hypertension. \par      Compared to last visit the hypertension is AT GOAL. The patients cardiovascular risk factors include hypertension, hyperlipidemia, impaired fasting glucose, and obesity. Lifestyle modifications that the patient has adopted include dietary sodium reduction, increasing physical activity, attempts at weight reduction. Responses to the medications has been EXCELLENT. Adverse effects of the medication include none . Patient denies chest pain , palpitations, headaches, dizziness. Her weight has CHANGED BY +1\par Last A1c 6.2\par Mixed hyperlipidemia- Last LDL  148  Aug\par The LDL goal for this patient is < 130 mg/dl.  Dietary modifications have included a low fat diet.  Exercise modifications have included NONE. Responses to the medications have been NOT APPLICABLE  AS NOT ON MED.The patient's adherence to the medication treatment is NA AS NOT ON MED. The side effects of the medication include NA AS NOT ON MED; the patient specifically denies myalgias.

## 2023-02-16 NOTE — ASSESSMENT
[FreeTextEntry1] : Essential (primary) hypertension - I10 (Primary), Cont med and low salt diet. Urged to increase physical activity and increase efforts at wt loss. BP control remains excellent. \par  \par Impaired fasting glucose - R73.01 Reviewed with pt rationale for A1c and criteria for dm. AGAIN, I strongly urged she lose some weight each visit.  As the weather will soon be warming up I strongly encouraged her to get out and walk more often and more briskly.  Last A1c NOT AT GOAL 6.2\par \par CHOL- Check lipid profile and liver function tests. Urged low cholesterol diet as well as weight loss through diet and exercise. LDL goal is less than 130 mg/dl. Last LDL was NOT AT GOAL 154 \par \par Gen. anxiety disorder -doing well on sertraline 100 mg daily. Continue sertraline.\par \par \par \par \par \par \par \par \par \par \par \par \par \par

## 2023-02-18 LAB
ALBUMIN SERPL ELPH-MCNC: 4.5 G/DL
ALP BLD-CCNC: 106 U/L
ALT SERPL-CCNC: 14 U/L
ANION GAP SERPL CALC-SCNC: 13 MMOL/L
AST SERPL-CCNC: 15 U/L
BILIRUB SERPL-MCNC: 0.2 MG/DL
BUN SERPL-MCNC: 18 MG/DL
CALCIUM SERPL-MCNC: 9.6 MG/DL
CHLORIDE SERPL-SCNC: 103 MMOL/L
CHOLEST SERPL-MCNC: 216 MG/DL
CO2 SERPL-SCNC: 22 MMOL/L
CREAT SERPL-MCNC: 0.8 MG/DL
EGFR: 88 ML/MIN/1.73M2
ESTIMATED AVERAGE GLUCOSE: 131 MG/DL
GLUCOSE SERPL-MCNC: 96 MG/DL
HBA1C MFR BLD HPLC: 6.2 %
HCV AB SER QL: NONREACTIVE
HCV S/CO RATIO: 0.13 S/CO
HDLC SERPL-MCNC: 47 MG/DL
LDLC SERPL CALC-MCNC: 137 MG/DL
NONHDLC SERPL-MCNC: 168 MG/DL
POTASSIUM SERPL-SCNC: 4.5 MMOL/L
PROT SERPL-MCNC: 7.3 G/DL
SODIUM SERPL-SCNC: 138 MMOL/L
TRIGL SERPL-MCNC: 157 MG/DL

## 2023-05-16 ENCOUNTER — APPOINTMENT (OUTPATIENT)
Dept: INTERNAL MEDICINE | Facility: CLINIC | Age: 53
End: 2023-05-16
Payer: COMMERCIAL

## 2023-05-16 VITALS
HEIGHT: 65 IN | SYSTOLIC BLOOD PRESSURE: 130 MMHG | WEIGHT: 185 LBS | BODY MASS INDEX: 30.82 KG/M2 | HEART RATE: 64 BPM | DIASTOLIC BLOOD PRESSURE: 80 MMHG

## 2023-05-16 PROCEDURE — 36415 COLL VENOUS BLD VENIPUNCTURE: CPT

## 2023-05-16 PROCEDURE — 99214 OFFICE O/P EST MOD 30 MIN: CPT | Mod: 25

## 2023-05-16 NOTE — HEALTH RISK ASSESSMENT
[0] : 2) Feeling down, depressed, or hopeless: Not at all (0) [PHQ-2 Negative - No further assessment needed] : PHQ-2 Negative - No further assessment needed [Yes] : Yes [Monthly or less (1 pt)] : Monthly or less (1 point) [1 or 2 (0 pts)] : 1 or 2 (0 points) [Never (0 pts)] : Never (0 points) [No] : In the past 12 months have you used drugs other than those required for medical reasons? No [No falls in past year] : Patient reported no falls in the past year [Audit-CScore] : 1 [[id number]] : 0 [Never] : Never

## 2023-05-16 NOTE — ASSESSMENT
[FreeTextEntry1] : Essential (primary) hypertension - I10 (Primary), Cont med and low salt diet. Urged to increase physical activity and increase efforts at wt loss. BP control remains excellent. \par  \par Impaired fasting glucose - R73.01 Reviewed with pt rationale for A1c and criteria for dm. AGAIN, I strongly urged she lose some weight each visit.  As the weather will soon be warming up I strongly encouraged her to get out and walk more often and more briskly.  Last A1c NOT AT GOAL 6.2\par \par CHOL- Check lipid profile and liver function tests. Urged low cholesterol diet as well as weight loss through diet and exercise. LDL goal is less than 130 mg/dl. Last LDL was NOT AT GOAL 137 \par \par Gen. anxiety disorder -doing well on sertraline 100 mg daily. Continue sertraline.\par \par \par \par \par \par \par \par \par \par \par \par \par \par

## 2023-05-16 NOTE — HISTORY OF PRESENT ILLNESS
[FreeTextEntry1] : here for followup of hypertension and impaired fasting glucose\par \par  [de-identified] : Hypertension. \par      Compared to last visit the hypertension is AT GOAL. The patients cardiovascular risk factors include hypertension, hyperlipidemia, impaired fasting glucose, and obesity. Lifestyle modifications that the patient has adopted include dietary sodium reduction, increasing physical activity, attempts at weight reduction. Responses to the medications has been EXCELLENT. Adverse effects of the medication include none . Patient denies chest pain , palpitations, headaches, dizziness. Her weight has CHANGED BY +4\par Last A1c 6.2\par Mixed hyperlipidemia- Last LDL  148  Aug\par The LDL goal for this patient is < 130 mg/dl.  Dietary modifications have included a low fat diet.  Exercise modifications have included NONE. Responses to the medications have been NOT APPLICABLE  AS NOT ON MED.The patient's adherence to the medication treatment is NA AS NOT ON MED. The side effects of the medication include NA AS NOT ON MED; the patient specifically denies myalgias.

## 2023-05-16 NOTE — PHYSICAL EXAM
[No Acute Distress] : no acute distress [No JVD] : no jugular venous distention [Normal] : normal rate, regular rhythm, normal S1 and S2 and no murmur heard [No Carotid Bruits] : no carotid bruits [No Edema] : there was no peripheral edema [de-identified] : Obese [de-identified] : no S4; split S1

## 2023-05-17 LAB
ALBUMIN SERPL ELPH-MCNC: 4.4 G/DL
ALP BLD-CCNC: 103 U/L
ALT SERPL-CCNC: 13 U/L
ANION GAP SERPL CALC-SCNC: 13 MMOL/L
AST SERPL-CCNC: 15 U/L
BILIRUB SERPL-MCNC: 0.2 MG/DL
BUN SERPL-MCNC: 18 MG/DL
CALCIUM SERPL-MCNC: 9.6 MG/DL
CHLORIDE SERPL-SCNC: 104 MMOL/L
CHOLEST SERPL-MCNC: 216 MG/DL
CO2 SERPL-SCNC: 21 MMOL/L
CREAT SERPL-MCNC: 0.78 MG/DL
EGFR: 90 ML/MIN/1.73M2
ESTIMATED AVERAGE GLUCOSE: 134 MG/DL
GLUCOSE SERPL-MCNC: 94 MG/DL
HBA1C MFR BLD HPLC: 6.3 %
HDLC SERPL-MCNC: 46 MG/DL
LDLC SERPL CALC-MCNC: 140 MG/DL
NONHDLC SERPL-MCNC: 170 MG/DL
POTASSIUM SERPL-SCNC: 4.4 MMOL/L
PROT SERPL-MCNC: 7.3 G/DL
SODIUM SERPL-SCNC: 138 MMOL/L
TRIGL SERPL-MCNC: 150 MG/DL

## 2023-05-29 ENCOUNTER — NON-APPOINTMENT (OUTPATIENT)
Age: 53
End: 2023-05-29

## 2023-08-08 ENCOUNTER — APPOINTMENT (OUTPATIENT)
Dept: INTERNAL MEDICINE | Facility: CLINIC | Age: 53
End: 2023-08-08
Payer: COMMERCIAL

## 2023-08-08 VITALS
BODY MASS INDEX: 30.16 KG/M2 | SYSTOLIC BLOOD PRESSURE: 120 MMHG | HEART RATE: 68 BPM | DIASTOLIC BLOOD PRESSURE: 70 MMHG | HEIGHT: 65 IN | WEIGHT: 181 LBS

## 2023-08-08 PROCEDURE — 36415 COLL VENOUS BLD VENIPUNCTURE: CPT

## 2023-08-08 PROCEDURE — 99214 OFFICE O/P EST MOD 30 MIN: CPT | Mod: 25

## 2023-08-08 NOTE — HISTORY OF PRESENT ILLNESS
[FreeTextEntry1] : here for followup of hypertension and impaired fasting glucose\par  \par   [de-identified] : Hypertension.       Compared to last visit the hypertension is AT GOAL. The patients cardiovascular risk factors include hypertension, hyperlipidemia, impaired fasting glucose, and obesity. Lifestyle modifications that the patient has adopted include dietary sodium reduction, increasing physical activity, attempts at weight reduction. Responses to the medications has been EXCELLENT. Adverse effects of the medication include none. Patient denies chest pain, palpitations, headaches, dizziness. Her weight has CHANGED BY -4 Last A1c 6.3 Mixed hyperlipidemia- Last   The LDL goal for this patient is < 130 mg/dl.  Dietary modifications have included a low fat diet.  Exercise modifications have included NONE. Responses to the medications have been NOT APPLICABLE  AS NOT ON MED.The patient's adherence to the medication treatment is NA AS NOT ON MED. The side effects of the medication include NA AS NOT ON MED; the patient specifically denies myalgias.

## 2023-08-08 NOTE — PHYSICAL EXAM
[No Acute Distress] : no acute distress [No JVD] : no jugular venous distention [Normal] : normal rate, regular rhythm, normal S1 and S2 and no murmur heard [No Carotid Bruits] : no carotid bruits [No Edema] : there was no peripheral edema [de-identified] : Obese [de-identified] : no S4; split S1

## 2023-08-08 NOTE — ASSESSMENT
[FreeTextEntry1] : Essential (primary) hypertension - I10 (Primary), Cont med and low salt diet. Urged to increase physical activity and increase efforts at wt loss. BP control remains excellent.    Impaired fasting glucose - R73.01 Reviewed with pt rationale for A1c and criteria for dm. AGAIN, I strongly urged she lose some weight each visit.  I reviewed the last 2 years A1Cs and pointed out to her that it is slowly rising.  Last A1c NOT AT GOAL 6.3  CHOL- Check lipid profile and liver function tests. Urged low cholesterol diet as well as weight loss through diet and exercise. LDL goal is less than 130 mg/dl. Last LDL was NOT AT GOAL 140 . Likely will start statin nv.  Gen. anxiety disorder -doing well on sertraline 100 mg daily. Continue sertraline.  I reminded her to go to GYN for Pap test. She declines HIV testing.

## 2023-08-08 NOTE — HEALTH RISK ASSESSMENT
[Yes] : Yes [Monthly or less (1 pt)] : Monthly or less (1 point) [1 or 2 (0 pts)] : 1 or 2 (0 points) [Never (0 pts)] : Never (0 points) [No] : In the past 12 months have you used drugs other than those required for medical reasons? No [No falls in past year] : Patient reported no falls in the past year [0] : 2) Feeling down, depressed, or hopeless: Not at all (0) [PHQ-2 Negative - No further assessment needed] : PHQ-2 Negative - No further assessment needed [Never] : Never [Audit-CScore] : 1 [[id number]] : 0

## 2023-08-09 LAB
ALBUMIN SERPL ELPH-MCNC: 4.6 G/DL
ALP BLD-CCNC: 100 U/L
ALT SERPL-CCNC: 14 U/L
ANION GAP SERPL CALC-SCNC: 14 MMOL/L
AST SERPL-CCNC: 13 U/L
BILIRUB SERPL-MCNC: 0.2 MG/DL
BUN SERPL-MCNC: 17 MG/DL
CALCIUM SERPL-MCNC: 9.6 MG/DL
CHLORIDE SERPL-SCNC: 106 MMOL/L
CHOLEST SERPL-MCNC: 241 MG/DL
CO2 SERPL-SCNC: 21 MMOL/L
CREAT SERPL-MCNC: 0.73 MG/DL
EGFR: 97 ML/MIN/1.73M2
ESTIMATED AVERAGE GLUCOSE: 126 MG/DL
GLUCOSE SERPL-MCNC: 108 MG/DL
HBA1C MFR BLD HPLC: 6 %
HDLC SERPL-MCNC: 46 MG/DL
LDLC SERPL CALC-MCNC: 166 MG/DL
NONHDLC SERPL-MCNC: 195 MG/DL
POTASSIUM SERPL-SCNC: 4.4 MMOL/L
PROT SERPL-MCNC: 7.1 G/DL
SODIUM SERPL-SCNC: 141 MMOL/L
TRIGL SERPL-MCNC: 160 MG/DL

## 2023-10-30 ENCOUNTER — RX RENEWAL (OUTPATIENT)
Age: 53
End: 2023-10-30

## 2023-11-14 ENCOUNTER — LABORATORY RESULT (OUTPATIENT)
Age: 53
End: 2023-11-14

## 2023-11-14 ENCOUNTER — APPOINTMENT (OUTPATIENT)
Dept: INTERNAL MEDICINE | Facility: CLINIC | Age: 53
End: 2023-11-14
Payer: COMMERCIAL

## 2023-11-14 VITALS
HEIGHT: 65 IN | SYSTOLIC BLOOD PRESSURE: 124 MMHG | WEIGHT: 179 LBS | HEART RATE: 64 BPM | DIASTOLIC BLOOD PRESSURE: 70 MMHG | BODY MASS INDEX: 29.82 KG/M2

## 2023-11-14 DIAGNOSIS — Z00.00 ENCOUNTER FOR GENERAL ADULT MEDICAL EXAMINATION W/OUT ABNORMAL FINDINGS: ICD-10-CM

## 2023-11-14 DIAGNOSIS — Z86.010 PERSONAL HISTORY OF COLONIC POLYPS: ICD-10-CM

## 2023-11-14 PROCEDURE — 90686 IIV4 VACC NO PRSV 0.5 ML IM: CPT

## 2023-11-14 PROCEDURE — 99396 PREV VISIT EST AGE 40-64: CPT | Mod: 25

## 2023-11-14 PROCEDURE — G0008: CPT

## 2023-11-14 PROCEDURE — 93000 ELECTROCARDIOGRAM COMPLETE: CPT

## 2023-11-14 PROCEDURE — 36415 COLL VENOUS BLD VENIPUNCTURE: CPT

## 2023-11-15 ENCOUNTER — NON-APPOINTMENT (OUTPATIENT)
Age: 53
End: 2023-11-15

## 2023-11-15 LAB
ALBUMIN SERPL ELPH-MCNC: 4.4 G/DL
ALP BLD-CCNC: 99 U/L
ALT SERPL-CCNC: 12 U/L
ANION GAP SERPL CALC-SCNC: 11 MMOL/L
APPEARANCE: CLEAR
AST SERPL-CCNC: 13 U/L
BASOPHILS # BLD AUTO: 0.11 K/UL
BASOPHILS NFR BLD AUTO: 1.2 %
BILIRUB SERPL-MCNC: 0.3 MG/DL
BILIRUBIN URINE: NEGATIVE
BLOOD URINE: NEGATIVE
BUN SERPL-MCNC: 20 MG/DL
CALCIUM SERPL-MCNC: 9.4 MG/DL
CHLORIDE SERPL-SCNC: 105 MMOL/L
CHOLEST SERPL-MCNC: 206 MG/DL
CO2 SERPL-SCNC: 24 MMOL/L
COLOR: YELLOW
CREAT SERPL-MCNC: 0.75 MG/DL
EGFR: 94 ML/MIN/1.73M2
EOSINOPHIL # BLD AUTO: 0.16 K/UL
EOSINOPHIL NFR BLD AUTO: 1.8 %
ESTIMATED AVERAGE GLUCOSE: 123 MG/DL
GLUCOSE QUALITATIVE U: NEGATIVE MG/DL
GLUCOSE SERPL-MCNC: 91 MG/DL
HBA1C MFR BLD HPLC: 5.9 %
HCT VFR BLD CALC: 38.6 %
HDLC SERPL-MCNC: 46 MG/DL
HGB BLD-MCNC: 12.3 G/DL
IMM GRANULOCYTES NFR BLD AUTO: 0.3 %
KETONES URINE: NEGATIVE MG/DL
LDLC SERPL CALC-MCNC: 139 MG/DL
LEUKOCYTE ESTERASE URINE: ABNORMAL
LYMPHOCYTES # BLD AUTO: 1.62 K/UL
LYMPHOCYTES NFR BLD AUTO: 18 %
MAN DIFF?: NORMAL
MCHC RBC-ENTMCNC: 28.5 PG
MCHC RBC-ENTMCNC: 31.9 GM/DL
MCV RBC AUTO: 89.4 FL
MONOCYTES # BLD AUTO: 0.59 K/UL
MONOCYTES NFR BLD AUTO: 6.6 %
NEUTROPHILS # BLD AUTO: 6.48 K/UL
NEUTROPHILS NFR BLD AUTO: 72.1 %
NITRITE URINE: NEGATIVE
NONHDLC SERPL-MCNC: 159 MG/DL
PH URINE: 5.5
PLATELET # BLD AUTO: 363 K/UL
POTASSIUM SERPL-SCNC: 4.6 MMOL/L
PROT SERPL-MCNC: 7.2 G/DL
PROTEIN URINE: NEGATIVE MG/DL
RBC # BLD: 4.32 M/UL
RBC # FLD: 13.9 %
SODIUM SERPL-SCNC: 140 MMOL/L
SPECIFIC GRAVITY URINE: 1.02
TRIGL SERPL-MCNC: 113 MG/DL
TSH SERPL-ACNC: 1.95 UIU/ML
UROBILINOGEN URINE: 0.2 MG/DL
WBC # FLD AUTO: 8.99 K/UL

## 2024-01-13 DIAGNOSIS — Z12.31 ENCOUNTER FOR SCREENING MAMMOGRAM FOR MALIGNANT NEOPLASM OF BREAST: ICD-10-CM

## 2024-01-16 ENCOUNTER — RESULT REVIEW (OUTPATIENT)
Age: 54
End: 2024-01-16

## 2024-02-27 ENCOUNTER — APPOINTMENT (OUTPATIENT)
Dept: INTERNAL MEDICINE | Facility: CLINIC | Age: 54
End: 2024-02-27
Payer: COMMERCIAL

## 2024-02-27 VITALS
HEART RATE: 72 BPM | WEIGHT: 179 LBS | BODY MASS INDEX: 29.82 KG/M2 | OXYGEN SATURATION: 98 % | HEIGHT: 65 IN | SYSTOLIC BLOOD PRESSURE: 120 MMHG | DIASTOLIC BLOOD PRESSURE: 80 MMHG

## 2024-02-27 PROCEDURE — 36415 COLL VENOUS BLD VENIPUNCTURE: CPT

## 2024-02-27 PROCEDURE — G2211 COMPLEX E/M VISIT ADD ON: CPT | Mod: NC,1L

## 2024-02-27 PROCEDURE — 99214 OFFICE O/P EST MOD 30 MIN: CPT

## 2024-02-27 NOTE — HISTORY OF PRESENT ILLNESS
[FreeTextEntry1] : 53-year-old woman here for followup of hypertension and impaired fasting glucose   [de-identified] : Hypertension.  Compared to last visit the hypertension is AT GOAL. The patients cardiovascular risk factors include hypertension, hyperlipidemia, impaired fasting glucose, and obesity. Lifestyle modifications that the patient has adopted include dietary sodium reduction, increasing physical activity, attempts at weight reduction. Responses to the medications has been EXCELLENT. Adverse effects of the medication include none. Patient denies chest pain, palpitations, headaches, dizziness. Her weight has CHANGED BY 0 Last A1c 6.3 Mixed hyperlipidemia- Last   Nov The LDL goal for this patient is < 130 mg/dl.  Dietary modifications have included a low-fat diet.  Exercise modifications have included NONE. Responses to the medications have been NOT APPLICABLE  AS NOT ON MED. The patient's adherence to the medication treatment is NA AS NOT ON MED. The side effects of the medication include NA AS NOT ON MED; the patient specifically denies myalgias.

## 2024-02-27 NOTE — PHYSICAL EXAM
[No Acute Distress] : no acute distress [No JVD] : no jugular venous distention [No Carotid Bruits] : no carotid bruits [Normal] : normal rate, regular rhythm, normal S1 and S2 and no murmur heard [No Edema] : there was no peripheral edema [de-identified] : Obese [de-identified] : no S4; split S1

## 2024-02-27 NOTE — ASSESSMENT
[FreeTextEntry1] : Essential (primary) hypertension - I10 (Primary), Cont med and low salt diet. Urged to increase physical activity and increase efforts at wt loss. BP control remains excellent.    Impaired fasting glucose - Previously reviewed with pt rationale for A1c and criteria for dm. AGAIN, I strongly urged she lose some weight each visit. Last A1c AT GOAL 5.9  CHOL- Check lipid profile and liver function tests. Urged low cholesterol diet as well as weight loss through diet and exercise. LDL goal is less than 130 mg/dl. Last LDL was NOT AT GOAL 139 .  Her ASCVD score remains low at 2.87 hence will not start statin currently.  Gen. anxiety disorder -doing well on sertraline 100 mg daily. Continue sertraline.  I reminded her to go to GYN for Pap test AGAIN especially as she still has her IUD.

## 2024-02-27 NOTE — HEALTH RISK ASSESSMENT
[Yes] : Yes [Monthly or less (1 pt)] : Monthly or less (1 point) [1 or 2 (0 pts)] : 1 or 2 (0 points) [Never (0 pts)] : Never (0 points) [No falls in past year] : Patient reported no falls in the past year [No] : In the past 12 months have you used drugs other than those required for medical reasons? No [0] : 2) Feeling down, depressed, or hopeless: Not at all (0) [PHQ-2 Negative - No further assessment needed] : PHQ-2 Negative - No further assessment needed [Never] : Never [Audit-CScore] : 1 [[id number]] : 0

## 2024-02-28 LAB
ALBUMIN SERPL ELPH-MCNC: 4.5 G/DL
ALP BLD-CCNC: 95 U/L
ALT SERPL-CCNC: 18 U/L
ANION GAP SERPL CALC-SCNC: 10 MMOL/L
AST SERPL-CCNC: 17 U/L
BILIRUB SERPL-MCNC: 0.2 MG/DL
BUN SERPL-MCNC: 17 MG/DL
CALCIUM SERPL-MCNC: 9.4 MG/DL
CHLORIDE SERPL-SCNC: 103 MMOL/L
CHOLEST SERPL-MCNC: 215 MG/DL
CO2 SERPL-SCNC: 25 MMOL/L
CREAT SERPL-MCNC: 0.71 MG/DL
EGFR: 100 ML/MIN/1.73M2
ESTIMATED AVERAGE GLUCOSE: 131 MG/DL
GLUCOSE SERPL-MCNC: 97 MG/DL
HBA1C MFR BLD HPLC: 6.2 %
HDLC SERPL-MCNC: 41 MG/DL
LDLC SERPL CALC-MCNC: 150 MG/DL
NONHDLC SERPL-MCNC: 173 MG/DL
POTASSIUM SERPL-SCNC: 4.6 MMOL/L
PROT SERPL-MCNC: 7.4 G/DL
SODIUM SERPL-SCNC: 139 MMOL/L
TRIGL SERPL-MCNC: 131 MG/DL

## 2024-05-07 ENCOUNTER — RX RENEWAL (OUTPATIENT)
Age: 54
End: 2024-05-07

## 2024-05-28 ENCOUNTER — APPOINTMENT (OUTPATIENT)
Dept: INTERNAL MEDICINE | Facility: CLINIC | Age: 54
End: 2024-05-28
Payer: COMMERCIAL

## 2024-05-28 VITALS
BODY MASS INDEX: 29.66 KG/M2 | HEART RATE: 68 BPM | WEIGHT: 178 LBS | SYSTOLIC BLOOD PRESSURE: 120 MMHG | DIASTOLIC BLOOD PRESSURE: 80 MMHG | HEIGHT: 65 IN

## 2024-05-28 DIAGNOSIS — I10 ESSENTIAL (PRIMARY) HYPERTENSION: ICD-10-CM

## 2024-05-28 DIAGNOSIS — E78.2 MIXED HYPERLIPIDEMIA: ICD-10-CM

## 2024-05-28 DIAGNOSIS — R73.01 IMPAIRED FASTING GLUCOSE: ICD-10-CM

## 2024-05-28 DIAGNOSIS — F41.1 GENERALIZED ANXIETY DISORDER: ICD-10-CM

## 2024-05-28 PROCEDURE — G2211 COMPLEX E/M VISIT ADD ON: CPT | Mod: NC,1L

## 2024-05-28 PROCEDURE — 36415 COLL VENOUS BLD VENIPUNCTURE: CPT

## 2024-05-28 PROCEDURE — 99214 OFFICE O/P EST MOD 30 MIN: CPT

## 2024-05-28 RX ORDER — SERTRALINE HYDROCHLORIDE 100 MG/1
100 TABLET, FILM COATED ORAL
Qty: 90 | Refills: 1 | Status: ACTIVE | COMMUNITY
Start: 2024-05-28

## 2024-05-28 RX ORDER — ATENOLOL 25 MG/1
25 TABLET ORAL
Qty: 90 | Refills: 1 | Status: ACTIVE | COMMUNITY
Start: 2024-05-28

## 2024-05-28 NOTE — HISTORY OF PRESENT ILLNESS
[FreeTextEntry1] : 53-year-old woman here for followup of hypertension and impaired fasting glucose   [de-identified] : Hypertension.  Compared to last visit the hypertension is AT GOAL. The patients cardiovascular risk factors include hypertension, hyperlipidemia, impaired fasting glucose, and obesity. Lifestyle modifications that the patient has adopted include dietary sodium reduction, increasing physical activity, attempts at weight reduction. Responses to the medications has been EXCELLENT. Adverse effects of the medication include none. Patient denies chest pain, palpitations, headaches, dizziness. Her weight has CHANGED BY -1 Last A1c 6.3 Mixed hyperlipidemia- Last   Nov The LDL goal for this patient is < 130 mg/dl.  Dietary modifications have included a low-fat diet.  Exercise modifications have included NONE. Responses to the medications have been NOT APPLICABLE  AS NOT ON MED. The patient's adherence to the medication treatment is NA AS NOT ON MED. The side effects of the medication include NA AS NOT ON MED; the patient specifically denies myalgias.

## 2024-05-28 NOTE — PHYSICAL EXAM
[No Acute Distress] : no acute distress [No JVD] : no jugular venous distention [Normal] : normal rate, regular rhythm, normal S1 and S2 and no murmur heard [No Carotid Bruits] : no carotid bruits [No Edema] : there was no peripheral edema [de-identified] : Obese [de-identified] : no S4; split S1

## 2024-05-28 NOTE — ASSESSMENT
[FreeTextEntry1] : Essential (primary) hypertension - I10 (Primary), Cont med and low salt diet. Urged to increase physical activity and increase efforts at wt loss. BP control remains excellent.    Impaired fasting glucose - Previously reviewed with pt rationale for A1c and criteria for dm. AGAIN, I strongly urged she lose some weight each visit. Last A1c NOT AT GOAL 6.2 but FBSs have been normal/okay  CHOL- Check lipid profile and liver function tests. Urged low cholesterol diet as well as weight loss through diet and exercise. LDL goal is less than 130 mg/dl. Last LDL was NOT AT GOAL 150 .  Her ASCVD score remains low hence will not start statin currently.  Gen. anxiety disorder -doing well on sertraline 100 mg daily. Continue sertraline.  I reminded her to go to GYN for Pap test AGAIN especially as she still has her IUD.

## 2024-10-29 ENCOUNTER — APPOINTMENT (OUTPATIENT)
Dept: INTERNAL MEDICINE | Facility: CLINIC | Age: 54
End: 2024-10-29

## 2024-12-30 ENCOUNTER — APPOINTMENT (OUTPATIENT)
Dept: INTERNAL MEDICINE | Facility: CLINIC | Age: 54
End: 2024-12-30

## 2024-12-30 VITALS
DIASTOLIC BLOOD PRESSURE: 72 MMHG | BODY MASS INDEX: 29.32 KG/M2 | SYSTOLIC BLOOD PRESSURE: 138 MMHG | HEIGHT: 65 IN | WEIGHT: 176 LBS | HEART RATE: 66 BPM

## 2024-12-30 DIAGNOSIS — Z23 ENCOUNTER FOR IMMUNIZATION: ICD-10-CM

## 2024-12-30 DIAGNOSIS — R73.01 IMPAIRED FASTING GLUCOSE: ICD-10-CM

## 2024-12-30 DIAGNOSIS — I10 ESSENTIAL (PRIMARY) HYPERTENSION: ICD-10-CM

## 2024-12-30 DIAGNOSIS — E78.2 MIXED HYPERLIPIDEMIA: ICD-10-CM

## 2024-12-30 PROCEDURE — G0008: CPT

## 2024-12-30 PROCEDURE — 90656 IIV3 VACC NO PRSV 0.5 ML IM: CPT

## 2024-12-30 PROCEDURE — 36415 COLL VENOUS BLD VENIPUNCTURE: CPT

## 2024-12-30 PROCEDURE — 99214 OFFICE O/P EST MOD 30 MIN: CPT | Mod: 25

## 2025-01-02 LAB
ALBUMIN SERPL ELPH-MCNC: 4.6 G/DL
ALP BLD-CCNC: 95 U/L
ALT SERPL-CCNC: 17 U/L
ANION GAP SERPL CALC-SCNC: 9 MMOL/L
AST SERPL-CCNC: 16 U/L
BILIRUB SERPL-MCNC: 0.2 MG/DL
BUN SERPL-MCNC: 15 MG/DL
CALCIUM SERPL-MCNC: 9.4 MG/DL
CHLORIDE SERPL-SCNC: 101 MMOL/L
CHOLEST SERPL-MCNC: 211 MG/DL
CO2 SERPL-SCNC: 27 MMOL/L
CREAT SERPL-MCNC: 0.73 MG/DL
EGFR: 96 ML/MIN/1.73M2
ESTIMATED AVERAGE GLUCOSE: 126 MG/DL
GLUCOSE SERPL-MCNC: 101 MG/DL
HBA1C MFR BLD HPLC: 6 %
HDLC SERPL-MCNC: 38 MG/DL
LDLC SERPL CALC-MCNC: 144 MG/DL
NONHDLC SERPL-MCNC: 173 MG/DL
POTASSIUM SERPL-SCNC: 4.3 MMOL/L
PROT SERPL-MCNC: 7.5 G/DL
SODIUM SERPL-SCNC: 138 MMOL/L
TRIGL SERPL-MCNC: 161 MG/DL

## 2025-01-21 ENCOUNTER — RX RENEWAL (OUTPATIENT)
Age: 55
End: 2025-01-21

## 2025-04-21 ENCOUNTER — RX RENEWAL (OUTPATIENT)
Age: 55
End: 2025-04-21

## 2025-05-13 ENCOUNTER — NON-APPOINTMENT (OUTPATIENT)
Age: 55
End: 2025-05-13

## 2025-05-13 ENCOUNTER — APPOINTMENT (OUTPATIENT)
Dept: INTERNAL MEDICINE | Facility: CLINIC | Age: 55
End: 2025-05-13
Payer: COMMERCIAL

## 2025-05-13 VITALS
DIASTOLIC BLOOD PRESSURE: 84 MMHG | SYSTOLIC BLOOD PRESSURE: 138 MMHG | HEIGHT: 65 IN | HEART RATE: 66 BPM | RESPIRATION RATE: 18 BRPM | WEIGHT: 180 LBS | BODY MASS INDEX: 29.99 KG/M2

## 2025-05-13 VITALS
WEIGHT: 180 LBS | BODY MASS INDEX: 29.99 KG/M2 | HEIGHT: 65 IN | HEART RATE: 66 BPM | DIASTOLIC BLOOD PRESSURE: 90 MMHG | SYSTOLIC BLOOD PRESSURE: 140 MMHG

## 2025-05-13 DIAGNOSIS — Z86.0100 PERSONAL HISTORY OF COLON POLYPS, UNSPECIFIED: ICD-10-CM

## 2025-05-13 DIAGNOSIS — I10 ESSENTIAL (PRIMARY) HYPERTENSION: ICD-10-CM

## 2025-05-13 DIAGNOSIS — R92.30 DENSE BREASTS, UNSPECIFIED: ICD-10-CM

## 2025-05-13 DIAGNOSIS — Z12.31 ENCOUNTER FOR SCREENING MAMMOGRAM FOR MALIGNANT NEOPLASM OF BREAST: ICD-10-CM

## 2025-05-13 DIAGNOSIS — F41.1 GENERALIZED ANXIETY DISORDER: ICD-10-CM

## 2025-05-13 DIAGNOSIS — E78.2 MIXED HYPERLIPIDEMIA: ICD-10-CM

## 2025-05-13 DIAGNOSIS — R73.01 IMPAIRED FASTING GLUCOSE: ICD-10-CM

## 2025-05-13 DIAGNOSIS — Z81.8 FAMILY HISTORY OF OTHER MENTAL AND BEHAVIORAL DISORDERS: ICD-10-CM

## 2025-05-13 DIAGNOSIS — Z00.00 ENCOUNTER FOR GENERAL ADULT MEDICAL EXAMINATION W/OUT ABNORMAL FINDINGS: ICD-10-CM

## 2025-05-13 PROCEDURE — 99396 PREV VISIT EST AGE 40-64: CPT

## 2025-05-13 PROCEDURE — G0537: CPT

## 2025-05-13 PROCEDURE — 93000 ELECTROCARDIOGRAM COMPLETE: CPT

## 2025-05-13 PROCEDURE — 36415 COLL VENOUS BLD VENIPUNCTURE: CPT

## 2025-05-13 PROCEDURE — 99214 OFFICE O/P EST MOD 30 MIN: CPT | Mod: 25

## 2025-05-13 RX ORDER — ASCORBIC ACID 125 MG
113.5 TABLET,CHEWABLE ORAL DAILY
Refills: 0 | Status: ACTIVE | COMMUNITY
Start: 2025-05-13

## 2025-05-14 LAB
ALBUMIN SERPL ELPH-MCNC: 4.5 G/DL
ALP BLD-CCNC: 95 U/L
ALT SERPL-CCNC: 22 U/L
ANION GAP SERPL CALC-SCNC: 13 MMOL/L
APPEARANCE: CLEAR
AST SERPL-CCNC: 20 U/L
BASOPHILS # BLD AUTO: 0.1 K/UL
BASOPHILS NFR BLD AUTO: 1.3 %
BILIRUB SERPL-MCNC: 0.2 MG/DL
BILIRUBIN URINE: NEGATIVE
BLOOD URINE: NEGATIVE
BUN SERPL-MCNC: 17 MG/DL
CALCIUM SERPL-MCNC: 9.3 MG/DL
CHLORIDE SERPL-SCNC: 105 MMOL/L
CHOLEST SERPL-MCNC: 216 MG/DL
CO2 SERPL-SCNC: 22 MMOL/L
COLOR: YELLOW
CREAT SERPL-MCNC: 0.73 MG/DL
EGFRCR SERPLBLD CKD-EPI 2021: 96 ML/MIN/1.73M2
EOSINOPHIL # BLD AUTO: 0.18 K/UL
EOSINOPHIL NFR BLD AUTO: 2.4 %
ESTIMATED AVERAGE GLUCOSE: 123 MG/DL
GLUCOSE QUALITATIVE U: NEGATIVE MG/DL
GLUCOSE SERPL-MCNC: 99 MG/DL
HBA1C MFR BLD HPLC: 5.9 %
HCT VFR BLD CALC: 38.3 %
HDLC SERPL-MCNC: 49 MG/DL
HGB BLD-MCNC: 12.1 G/DL
IMM GRANULOCYTES NFR BLD AUTO: 0.3 %
KETONES URINE: NEGATIVE MG/DL
LDLC SERPL-MCNC: 151 MG/DL
LEUKOCYTE ESTERASE URINE: NEGATIVE
LYMPHOCYTES # BLD AUTO: 1.6 K/UL
LYMPHOCYTES NFR BLD AUTO: 21.4 %
MAN DIFF?: NORMAL
MCHC RBC-ENTMCNC: 28.1 PG
MCHC RBC-ENTMCNC: 31.6 G/DL
MCV RBC AUTO: 89.1 FL
MONOCYTES # BLD AUTO: 0.51 K/UL
MONOCYTES NFR BLD AUTO: 6.8 %
NEUTROPHILS # BLD AUTO: 5.06 K/UL
NEUTROPHILS NFR BLD AUTO: 67.8 %
NITRITE URINE: NEGATIVE
NONHDLC SERPL-MCNC: 167 MG/DL
PH URINE: 5.5
PLATELET # BLD AUTO: 355 K/UL
POTASSIUM SERPL-SCNC: 4.4 MMOL/L
PROT SERPL-MCNC: 7.1 G/DL
PROTEIN URINE: NEGATIVE MG/DL
RBC # BLD: 4.3 M/UL
RBC # FLD: 14.3 %
SODIUM SERPL-SCNC: 140 MMOL/L
SPECIFIC GRAVITY URINE: 1.02
TRIGL SERPL-MCNC: 88 MG/DL
TSH SERPL-ACNC: 1.82 UIU/ML
UROBILINOGEN URINE: 0.2 MG/DL
WBC # FLD AUTO: 7.47 K/UL

## 2025-05-21 ENCOUNTER — RX RENEWAL (OUTPATIENT)
Age: 55
End: 2025-05-21

## 2025-06-03 ENCOUNTER — RESULT REVIEW (OUTPATIENT)
Age: 55
End: 2025-06-03